# Patient Record
Sex: MALE | Race: WHITE | NOT HISPANIC OR LATINO | URBAN - METROPOLITAN AREA
[De-identification: names, ages, dates, MRNs, and addresses within clinical notes are randomized per-mention and may not be internally consistent; named-entity substitution may affect disease eponyms.]

---

## 2023-12-14 ENCOUNTER — OFFICE VISIT (OUTPATIENT)
Age: 38
End: 2023-12-14
Payer: COMMERCIAL

## 2023-12-14 VITALS
OXYGEN SATURATION: 98 % | BODY MASS INDEX: 31.34 KG/M2 | RESPIRATION RATE: 16 BRPM | HEART RATE: 83 BPM | SYSTOLIC BLOOD PRESSURE: 130 MMHG | HEIGHT: 66 IN | WEIGHT: 195 LBS | TEMPERATURE: 97.9 F | DIASTOLIC BLOOD PRESSURE: 72 MMHG

## 2023-12-14 DIAGNOSIS — Z11.4 SCREENING FOR HIV (HUMAN IMMUNODEFICIENCY VIRUS): ICD-10-CM

## 2023-12-14 DIAGNOSIS — E55.9 VITAMIN D INSUFFICIENCY: ICD-10-CM

## 2023-12-14 DIAGNOSIS — Z13.6 ENCOUNTER FOR LIPID SCREENING FOR CARDIOVASCULAR DISEASE: ICD-10-CM

## 2023-12-14 DIAGNOSIS — R03.0 ELEVATED BLOOD PRESSURE READING: ICD-10-CM

## 2023-12-14 DIAGNOSIS — R21 RASH: ICD-10-CM

## 2023-12-14 DIAGNOSIS — K21.9 GASTROESOPHAGEAL REFLUX DISEASE WITHOUT ESOPHAGITIS: ICD-10-CM

## 2023-12-14 DIAGNOSIS — Z00.00 HEALTHCARE MAINTENANCE: Primary | ICD-10-CM

## 2023-12-14 DIAGNOSIS — E55.9 INADEQUATE VITAMIN D AND VITAMIN D DERIVATIVE INTAKE: ICD-10-CM

## 2023-12-14 DIAGNOSIS — Z13.220 ENCOUNTER FOR LIPID SCREENING FOR CARDIOVASCULAR DISEASE: ICD-10-CM

## 2023-12-14 DIAGNOSIS — Z11.59 NEED FOR HEPATITIS C SCREENING TEST: ICD-10-CM

## 2023-12-14 PROCEDURE — 99385 PREV VISIT NEW AGE 18-39: CPT | Performed by: FAMILY MEDICINE

## 2023-12-14 PROCEDURE — 99214 OFFICE O/P EST MOD 30 MIN: CPT | Performed by: FAMILY MEDICINE

## 2023-12-14 RX ORDER — OMEPRAZOLE 20 MG/1
20 CAPSULE, DELAYED RELEASE ORAL DAILY
COMMUNITY

## 2023-12-14 RX ORDER — MULTIVIT-MIN/IRON FUM/FOLIC AC 7.5 MG-4
1 TABLET ORAL DAILY
COMMUNITY

## 2023-12-14 RX ORDER — PREDNISONE 10 MG/1
TABLET ORAL DAILY
Qty: 45 TABLET | Refills: 0 | Status: SHIPPED | OUTPATIENT
Start: 2023-12-14 | End: 2024-01-02

## 2023-12-14 NOTE — ASSESSMENT & PLAN NOTE
Severe viral or bacterial infection over a month ago which proceeded with start of rash of the bilateral arms and progressed to lower extremity, abdomen and just about the whole body except for palms and soles, genital region, buttock, face. Denies any itch. has been to the urgent care several times received 2 burst of prednisone treatment. Antiviral treatment no improvement. Exam significant for diffuse raised erythema without any discharge appears dry, severe throughout the body. Based on initial pictures to the exam today, it started out mild currently in severe form. 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   Return in 4 weeks after completion of labs  Keep well-hydrated, moisturize regularly

## 2023-12-14 NOTE — PROGRESS NOTES
[facility] PRIMARY CARE  [address]  [place], [place]    NAME: Sonia Bryson   AGE: 41 y.o. SEX: male   : 1985          Assessment and Plan:     1. Healthcare maintenance  Assessment & Plan:  Health maintenance completed today. - Medical history reviewed, including existing medical conditions, medications, and surgeries. - Labs discussed to evaluate cholesterol, blood sugar, kidney function, liver function, and other important markers of health. - BMI evaluated and discussed. - Lifestyle and health counseling completed including diet, exercise habits, smoking status, alcohol consumption.   - Cancer screenings discussed: CT lung/prostate/colonoscopy. - Mental health and wellbeing evaluated and discussed. - Family history obtained to identify any of hereditary health risks. Plan   Lab orders in place, f/u results. Start/continue preventative measures as discussed/advised  Complete preventative orders in place as recommended. Learn medication list including their indications as discussed. Return in 4 weeks after completion of labs. Orders:  -     CBC and differential; Future; Expected date: 2023  -     Comprehensive metabolic panel; Future; Expected date: 2023  -     Lipid Panel with Direct LDL reflex; Future  -     Vitamin D 25 hydroxy; Future  -     TSH, 3rd generation with Free T4 reflex; Future; Expected date: 2023    2. Elevated blood pressure reading  Assessment & Plan:  Elevated today  Blood Pressure: 130/72     DASH diet  And monitor for now  Follow up with labs   Return in 4 weeks    Orders:  -     Comprehensive metabolic panel; Future; Expected date: 2023  -     TSH, 3rd generation with Free T4 reflex; Future; Expected date: 2023    3. Gastroesophageal reflux disease without esophagitis  Assessment & Plan:  Currently on omeprazole 20 mg daily      4.  Encounter for lipid screening for cardiovascular disease  -     Lipid Panel with Direct LDL reflex; Future    5. Vitamin D insufficiency  -     Vitamin D 25 hydroxy; Future    6. Inadequate vitamin D and vitamin D derivative intake  -     Vitamin D 25 hydroxy; Future    7. Need for hepatitis C screening test  -     Hepatitis C Antibody; Future    8. Screening for HIV (human immunodeficiency virus)  -     HIV 1/2 AG/AB w Reflex [facility] for 2 yr old and above; Future    9. Rash  Assessment & Plan:  Severe viral or bacterial infection over a month ago which proceeded with start of rash of the bilateral arms and progressed to lower extremity, abdomen and just about the whole body except for palms and soles, genital region, buttock, face. Denies any itch. has been to the urgent care several times received 2 burst of prednisone treatment. Antiviral treatment no improvement. Exam significant for diffuse raised erythema without any discharge appears dry, severe throughout the body. Based on initial pictures to the exam today, it started out mild currently in severe form. 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 Return in 4 weeks after completion of labs  Keep well-hydrated, moisturize regularly    Orders:  -     predniSONE 10 mg tablet; Take 4 tablets (40 mg total) by mouth daily for 5 days, THEN 3 tablets (30 mg total) daily for 4 days, THEN 2 tablets (20 mg total) daily for 4 days, THEN 1 tablet (10 mg total) daily for 3 days, THEN 0.5 tablets (5 mg total) daily for 3 days. 4 tab x 4 days, 3 tabs x 3 days, 2 tabs x 2 days, 1 tab x 1 day then stop. -     Ambulatory Referral to Dermatology;  Future         Immunizations and preventive care screenings were discussed with patient today. Appropriate education was printed on patient's after visit summary. BMI Counseling: Body mass index is 31.47 kg/m². The BMI is above normal. Nutrition recommendations include decreasing portion sizes, encouraging healthy choices of fruits and vegetables, decreasing fast food intake, consuming healthier snacks, limiting drinks that contain sugar, moderation in carbohydrate intake, increasing intake of lean protein, reducing intake of saturated and trans fat and reducing intake of cholesterol. Exercise recommendations include moderate physical activity 150 minutes/week and strength training exercises. Rationale for BMI follow-up plan is due to patient being overweight or obese. Depression Screening and Follow-up Plan: Patient was screened for depression during today's encounter. They screened negative with a PHQ-2 score of 0. Counseling:  Alcohol/drug use: discussed moderation in alcohol intake, the recommendations for healthy alcohol use, and avoidance of illicit drug use. Dental Health: discussed importance of regular tooth brushing, flossing, and dental visits. Injury prevention: discussed safety/seat belts, safety helmets, smoke detectors, carbon dioxide detectors, and smoking near bedding or upholstery. Sexual health: discussed sexually transmitted diseases, partner selection, use of condoms, avoidance of unintended pregnancy, and contraceptive alternatives. Exercise: the importance of regular exercise/physical activity was discussed. Recommend exercise 3-5 times per week for at least 30 minutes.         Follow-up Plan:   Return in about 4 weeks (around 1/11/2024) for chronic disease management.        ________________________________________________________________________   Reason For Visit:   Physical Exam, Establish Care, and Rash        History of Present Illness:     Adult Annual Physical   Pratima Chapa is here for a comprehensive physical exam.     Concerns  The patient reports problems - rash . Rash that started in November 1st, getting worse. All over the body except for genital, butt, palms and soles. Diet and Physical Activity  Diet/Nutrition: poor diet. A lot of fast food. Exercise: no formal exercise. General Health  Sleep: gets 7-8 hours of sleep on average. Hearing: normal - bilateral.  Vision: wears contacts. Dental: regular dental visits and brushes teeth twice daily. Depression Screening  PHQ-2/9 Depression Screening    Little interest or pleasure in doing things: 0 - not at all  Feeling down, depressed, or hopeless: 0 - not at all  PHQ-2 Score: 0  PHQ-2 Interpretation: Negative depression screen       Anxiety: no.  Past thoughts of SI/SH: no.  Past trauma/significant events in life: no.     Health  none    Social History     Substance and Sexual Activity   Sexual Activity Not on file       Others  Safety concerns: no.  Smoking history: no.  Recreational drugs: yes. 2-3 times a week. Alcohol consumption: a few times a week maybe       Review of Systems:   Review of Systems   Constitutional:  Negative for chills, fever and unexpected weight change. HENT:  Negative for congestion, rhinorrhea and sore throat. Eyes:  Negative for visual disturbance. Respiratory:  Negative for chest tightness, shortness of breath and wheezing. Cardiovascular:  Negative for chest pain. Gastrointestinal:  Negative for abdominal pain, constipation, diarrhea, nausea and vomiting. Endocrine: Negative for polyuria. Genitourinary:  Negative for dysuria and hematuria. Skin:  Positive for rash. Neurological:  Negative for dizziness, weakness, light-headedness and headaches. Psychiatric/Behavioral:  Negative for confusion.          Past Medical History:     Past Medical History:   Diagnosis Date    GERD (gastroesophageal reflux disease)         Past Surgical History:     Past Surgical History:   Procedure Laterality Date    HERNIA REPAIR          Family History:     Family History   Problem Relation Age of Onset    Hypertension Mother     Glaucoma Mother         Social History:    reports that he has never smoked. He has never used smokeless tobacco. He reports current alcohol use of about 3.0 standard drinks of alcohol per week. He reports current drug use. Frequency: 3.00 times per week. Drug: Marijuana. Current Medications:     Current Outpatient Medications:     Multiple Vitamins-Minerals (multivitamin with minerals) tablet, Take 1 tablet by mouth daily, Disp: , Rfl:     omeprazole (PriLOSEC) 20 mg delayed release capsule, Take 20 mg by mouth daily, Disp: , Rfl:     predniSONE 10 mg tablet, Take 4 tablets (40 mg total) by mouth daily for 5 days, THEN 3 tablets (30 mg total) daily for 4 days, THEN 2 tablets (20 mg total) daily for 4 days, THEN 1 tablet (10 mg total) daily for 3 days, THEN 0.5 tablets (5 mg total) daily for 3 days. 4 tab x 4 days, 3 tabs x 3 days, 2 tabs x 2 days, 1 tab x 1 day then stop., Disp: 45 tablet, Rfl: 0     Allergies:   No Known Allergies      Physical Exam:     /72 (BP Location: Left arm, Patient Position: Sitting, Cuff Size: Standard)   Pulse 83   Temp 97.9 °F (36.6 °C) (Tympanic)   Resp 16   Ht 5' 6" (1.676 m)   Wt 88.5 kg (195 lb)   SpO2 98%   BMI 31.47 kg/m²      Physical Exam  Vitals reviewed. Constitutional:       General: He is not in acute distress. Appearance: Normal appearance. He is not ill-appearing, toxic-appearing or diaphoretic. HENT:      Head: Normocephalic and atraumatic. Right Ear: External ear normal.      Left Ear: External ear normal.      Nose: Nose normal.      Mouth/Throat:      Mouth: Mucous membranes are moist.   Eyes:      General: No scleral icterus. Right eye: No discharge. Left eye: No discharge. Extraocular Movements: Extraocular movements intact.       Conjunctiva/sclera: Conjunctivae normal. Cardiovascular:      Rate and Rhythm: Normal rate and regular rhythm. Pulses: Normal pulses. Heart sounds: Normal heart sounds. Pulmonary:      Effort: Pulmonary effort is normal. No respiratory distress. Breath sounds: Normal breath sounds. Abdominal:      Palpations: Abdomen is soft. Tenderness: There is no abdominal tenderness. Musculoskeletal:         General: No swelling. Normal range of motion. Cervical back: Normal range of motion. Skin:     General: Skin is warm and dry. Neurological:      General: No focal deficit present. Mental Status: He is alert and oriented to person, place, and time. Psychiatric:         Mood and Affect: Mood normal.         Behavior: Behavior normal.         Thought Content:  Thought content normal.           Yamile Ervin MD  Family Medicine Physician   [facility]

## 2023-12-14 NOTE — ASSESSMENT & PLAN NOTE
Health maintenance completed today. - Medical history reviewed, including existing medical conditions, medications, and surgeries. - Labs discussed to evaluate cholesterol, blood sugar, kidney function, liver function, and other important markers of health. - BMI evaluated and discussed. - Lifestyle and health counseling completed including diet, exercise habits, smoking status, alcohol consumption.   - Cancer screenings discussed: CT lung/prostate/colonoscopy. - Mental health and wellbeing evaluated and discussed. - Family history obtained to identify any of hereditary health risks. Plan   Lab orders in place, f/u results. Start/continue preventative measures as discussed/advised  Complete preventative orders in place as recommended. Learn medication list including their indications as discussed. Return in 4 weeks after completion of labs.

## 2023-12-14 NOTE — ASSESSMENT & PLAN NOTE
Elevated today  Blood Pressure: 130/72     DASH diet  And monitor for now  Follow up with labs   Return in 4 weeks

## 2023-12-15 ENCOUNTER — NURSE TRIAGE (OUTPATIENT)
Age: 38
End: 2023-12-15

## 2023-12-15 ENCOUNTER — TELEPHONE (OUTPATIENT)
Age: 38
End: 2023-12-15

## 2023-12-15 NOTE — TELEPHONE ENCOUNTER
PT has rash all over body that started about 3 weeks after being sick. PT not sure what he had. Rash has not improved and is getting worse even with three rounds of oral steroids. Was seen in urgent care. Has referral for Derm. Denies fevers or other symptoms besides feeling like he has a cold. Reason for Disposition   SEVERE itching    Answer Assessment - Initial Assessment Questions  1. APPEARANCE of RASH: "Describe the rash." (e.g., spots, blisters, raised areas, skin peeling, scaly)      It looked like poison ivy in the beginning and continued to spread over two weeks while I was on prednisone. It is blotchy looking and is red in color, flaky dry skin  2. SIZE: "How big are the spots?" (e.g., tip of pen, eraser, coin; inches, centimeters)      Some are small and some are big patches that are connected to each other  3. LOCATION: "Where is the rash located?"      All over body. See some improvement on arms but the rest of body no improvement  4. COLOR: "What color is the rash?" (Note: It is difficult to assess rash color in people with darker-colored skin. When this situation occurs, simply ask the caller to describe what they see.)      red  5. ONSET: "When did the rash begin?"      11/01/23  6. FEVER: "Do you have a fever?" If Yes, ask: "What is your temperature, how was it measured, and when did it start?"      Denies, had one prior to rash  7. ITCHING: "Does the rash itch?" If Yes, ask: "How bad is the itch?" (Scale 1-10; or mild, moderate, severe)      It is not as bad as it was when the rash first started  8. CAUSE: "What do you think is causing the rash?"      unsure  9. MEDICATION FACTORS: "Have you started any new medications within the last 2 weeks?" (e.g., antibiotics)       Prednisone but rash was before medication  10.  OTHER SYMPTOMS: "Do you have any other symptoms?" (e.g., dizziness, headache, sore throat, joint pain)        Feels like he has a cold now    Protocols used: Rash or Redness - Widespread-ADULT-OH

## 2023-12-15 NOTE — TELEPHONE ENCOUNTER
Received call from patient asking for an ASAP appt.    He has a referral with the status of ASAP for a rash.    HE stated that it began Nov 1st 2023 and is now on his 3rd round of steroids and it has done nothing.     He sated that the rash is all over his body except for private area, palm of his hand, bottom of his feet and head.

## 2023-12-16 ENCOUNTER — APPOINTMENT (OUTPATIENT)
Age: 38
End: 2023-12-16
Payer: COMMERCIAL

## 2023-12-16 DIAGNOSIS — Z13.220 ENCOUNTER FOR LIPID SCREENING FOR CARDIOVASCULAR DISEASE: ICD-10-CM

## 2023-12-16 DIAGNOSIS — Z11.4 SCREENING FOR HIV (HUMAN IMMUNODEFICIENCY VIRUS): ICD-10-CM

## 2023-12-16 DIAGNOSIS — E55.9 INADEQUATE VITAMIN D AND VITAMIN D DERIVATIVE INTAKE: ICD-10-CM

## 2023-12-16 DIAGNOSIS — Z13.6 ENCOUNTER FOR LIPID SCREENING FOR CARDIOVASCULAR DISEASE: ICD-10-CM

## 2023-12-16 DIAGNOSIS — R03.0 ELEVATED BLOOD PRESSURE READING: ICD-10-CM

## 2023-12-16 DIAGNOSIS — Z00.00 HEALTHCARE MAINTENANCE: ICD-10-CM

## 2023-12-16 DIAGNOSIS — Z11.59 NEED FOR HEPATITIS C SCREENING TEST: ICD-10-CM

## 2023-12-16 DIAGNOSIS — E55.9 VITAMIN D INSUFFICIENCY: ICD-10-CM

## 2023-12-16 LAB
25(OH)D3 SERPL-MCNC: 21.1 NG/ML (ref 30–100)
ALBUMIN SERPL BCP-MCNC: 4.3 G/DL (ref 3.5–5)
ALP SERPL-CCNC: 87 U/L (ref 34–104)
ALT SERPL W P-5'-P-CCNC: 24 U/L (ref 7–52)
ANION GAP SERPL CALCULATED.3IONS-SCNC: 7 MMOL/L
AST SERPL W P-5'-P-CCNC: 19 U/L (ref 13–39)
BASOPHILS # BLD AUTO: 0.03 THOUSANDS/ÂΜL (ref 0–0.1)
BASOPHILS NFR BLD AUTO: 0 % (ref 0–1)
BILIRUB SERPL-MCNC: 0.51 MG/DL (ref 0.2–1)
BUN SERPL-MCNC: 13 MG/DL (ref 5–25)
CALCIUM SERPL-MCNC: 9.9 MG/DL (ref 8.4–10.2)
CHLORIDE SERPL-SCNC: 103 MMOL/L (ref 96–108)
CHOLEST SERPL-MCNC: 196 MG/DL
CO2 SERPL-SCNC: 30 MMOL/L (ref 21–32)
CREAT SERPL-MCNC: 0.91 MG/DL (ref 0.6–1.3)
EOSINOPHIL # BLD AUTO: 0.12 THOUSAND/ÂΜL (ref 0–0.61)
EOSINOPHIL NFR BLD AUTO: 2 % (ref 0–6)
ERYTHROCYTE [DISTWIDTH] IN BLOOD BY AUTOMATED COUNT: 13.4 % (ref 11.6–15.1)
GFR SERPL CREATININE-BSD FRML MDRD: 107 ML/MIN/1.73SQ M
GLUCOSE P FAST SERPL-MCNC: 99 MG/DL (ref 65–99)
HCT VFR BLD AUTO: 44.5 % (ref 36.5–49.3)
HDLC SERPL-MCNC: 65 MG/DL
HGB BLD-MCNC: 14.5 G/DL (ref 12–17)
IMM GRANULOCYTES # BLD AUTO: 0.01 THOUSAND/UL (ref 0–0.2)
IMM GRANULOCYTES NFR BLD AUTO: 0 % (ref 0–2)
LDLC SERPL CALC-MCNC: 114 MG/DL (ref 0–100)
LYMPHOCYTES # BLD AUTO: 3.08 THOUSANDS/ÂΜL (ref 0.6–4.47)
LYMPHOCYTES NFR BLD AUTO: 42 % (ref 14–44)
MCH RBC QN AUTO: 29.4 PG (ref 26.8–34.3)
MCHC RBC AUTO-ENTMCNC: 32.6 G/DL (ref 31.4–37.4)
MCV RBC AUTO: 90 FL (ref 82–98)
MONOCYTES # BLD AUTO: 0.75 THOUSAND/ÂΜL (ref 0.17–1.22)
MONOCYTES NFR BLD AUTO: 10 % (ref 4–12)
NEUTROPHILS # BLD AUTO: 3.33 THOUSANDS/ÂΜL (ref 1.85–7.62)
NEUTS SEG NFR BLD AUTO: 46 % (ref 43–75)
NRBC BLD AUTO-RTO: 0 /100 WBCS
PLATELET # BLD AUTO: 308 THOUSANDS/UL (ref 149–390)
PMV BLD AUTO: 9.5 FL (ref 8.9–12.7)
POTASSIUM SERPL-SCNC: 4.1 MMOL/L (ref 3.5–5.3)
PROT SERPL-MCNC: 7.2 G/DL (ref 6.4–8.4)
RBC # BLD AUTO: 4.94 MILLION/UL (ref 3.88–5.62)
SODIUM SERPL-SCNC: 140 MMOL/L (ref 135–147)
TRIGL SERPL-MCNC: 86 MG/DL
TSH SERPL DL<=0.05 MIU/L-ACNC: 1.73 UIU/ML (ref 0.45–4.5)
WBC # BLD AUTO: 7.32 THOUSAND/UL (ref 4.31–10.16)

## 2023-12-16 PROCEDURE — 36415 COLL VENOUS BLD VENIPUNCTURE: CPT

## 2023-12-16 PROCEDURE — 86803 HEPATITIS C AB TEST: CPT

## 2023-12-16 PROCEDURE — 87389 HIV-1 AG W/HIV-1&-2 AB AG IA: CPT

## 2023-12-16 PROCEDURE — 84443 ASSAY THYROID STIM HORMONE: CPT

## 2023-12-16 PROCEDURE — 80053 COMPREHEN METABOLIC PANEL: CPT

## 2023-12-16 PROCEDURE — 85025 COMPLETE CBC W/AUTO DIFF WBC: CPT

## 2023-12-16 PROCEDURE — 80061 LIPID PANEL: CPT

## 2023-12-16 PROCEDURE — 82306 VITAMIN D 25 HYDROXY: CPT

## 2023-12-17 LAB
HCV AB SER QL: NORMAL
HIV 1+2 AB+HIV1 P24 AG SERPL QL IA: NORMAL
HIV 2 AB SERPL QL IA: NORMAL
HIV1 AB SERPL QL IA: NORMAL
HIV1 P24 AG SERPL QL IA: NORMAL

## 2023-12-18 ENCOUNTER — TELEPHONE (OUTPATIENT)
Age: 38
End: 2023-12-18

## 2023-12-18 DIAGNOSIS — E55.9 VITAMIN D DEFICIENCY: Primary | ICD-10-CM

## 2023-12-18 RX ORDER — CHOLECALCIFEROL (VITAMIN D3) 1250 MCG
50000 CAPSULE ORAL WEEKLY
Qty: 12 CAPSULE | Refills: 0 | Status: SHIPPED | OUTPATIENT
Start: 2023-12-18 | End: 2024-03-05

## 2023-12-18 NOTE — TELEPHONE ENCOUNTER
Spoke with patient and gave him detailed information left by Dr. Benitez about Vit D rx and over the counter Vit D. Also, told him to repeat labs in 3  months.

## 2023-12-20 ENCOUNTER — TELEPHONE (OUTPATIENT)
Dept: DERMATOLOGY | Facility: CLINIC | Age: 38
End: 2023-12-20

## 2023-12-20 ENCOUNTER — LAB (OUTPATIENT)
Dept: LAB | Facility: HOSPITAL | Age: 38
End: 2023-12-20
Payer: COMMERCIAL

## 2023-12-20 ENCOUNTER — CONSULT (OUTPATIENT)
Dept: DERMATOLOGY | Facility: CLINIC | Age: 38
End: 2023-12-20
Payer: COMMERCIAL

## 2023-12-20 VITALS — BODY MASS INDEX: 31.85 KG/M2 | WEIGHT: 198.2 LBS | HEIGHT: 66 IN

## 2023-12-20 DIAGNOSIS — R21 RASH: ICD-10-CM

## 2023-12-20 PROCEDURE — 88305 TISSUE EXAM BY PATHOLOGIST: CPT | Performed by: PATHOLOGY

## 2023-12-20 PROCEDURE — 86060 ANTISTREPTOLYSIN O TITER: CPT

## 2023-12-20 PROCEDURE — 36415 COLL VENOUS BLD VENIPUNCTURE: CPT

## 2023-12-20 PROCEDURE — 11104 PUNCH BX SKIN SINGLE LESION: CPT | Performed by: DERMATOLOGY

## 2023-12-20 PROCEDURE — 99204 OFFICE O/P NEW MOD 45 MIN: CPT | Performed by: DERMATOLOGY

## 2023-12-20 PROCEDURE — 88312 SPECIAL STAINS GROUP 1: CPT | Performed by: PATHOLOGY

## 2023-12-20 PROCEDURE — 86063 ANTISTREPTOLYSIN O SCREEN: CPT

## 2023-12-20 PROCEDURE — 86480 TB TEST CELL IMMUN MEASURE: CPT

## 2023-12-20 RX ORDER — TRIAMCINOLONE ACETONIDE 1 MG/G
CREAM TOPICAL 2 TIMES DAILY
Qty: 453.6 G | Refills: 2 | Status: SHIPPED | OUTPATIENT
Start: 2023-12-20

## 2023-12-20 NOTE — PATIENT INSTRUCTIONS
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 Take a one week break and then you may repeat cycle if needed for flares.  Discussed use of Otezla and/or phototherapy if biopsy proves guttate psoriasis.    PROCEDURE NOTE:  PUNCH BIOPSY    Plan:  1. Instructed to keep the wound dry and covered for 24-48h and clean thereafter.  2. Warning signs of infection were reviewed.    3. Recommended that the patient use acetaminophen as needed for pain  4. Sutures if any should be removed in 10-14 days      Standard post-procedure care has been explained and has been included in written form within the patient's copy of Informed Consent.

## 2023-12-20 NOTE — TELEPHONE ENCOUNTER
Dr. 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 Patient went for quantiferon gold this morning and had other labs drawn 12/16/2023.

## 2023-12-20 NOTE — PROGRESS NOTES
"[facility] Dermatology Clinic Note     Patient Name: Francisco Hampton  Encounter Date: 12/20/2023     Have you been cared for by a [facility] Dermatologist in the last 3 years and, if so, which description applies to you?    NO.   I am considered a \"new\" patient and must complete all patient intake questions. I am MALE/not capable of bearing children.    REVIEW OF SYSTEMS:  Have you recently had or currently have any of the following? Recent fever or chills? No  Any non-healing wound? No   PAST MEDICAL HISTORY:  Have you personally ever had or currently have any of the following?  If \"YES,\" then please provide more detail. Skin cancer (such as Melanoma, Basal Cell Carcinoma, Squamous Cell Carcinoma?  No  Tuberculosis, HIV/AIDS, Hepatitis B or C: No  Radiation Treatment No   HISTORY OF IMMUNOSUPPRESSION:   Do you have a history of any of the following:  Systemic Immunosuppression such as Diabetes, Biologic or Immunotherapy, Chemotherapy, Organ Transplantation, Bone Marrow Transplantation?  No     Answering \"YES\" requires the addition of the dotphrase \"IMMUNOSUPPRESSED\" as the first diagnosis of the patient's visit.   FAMILY HISTORY:  Any \"first degree relatives\" (parent, brother, sister, or child) with the following?    Skin Cancer, Pancreatic or Other Cancer? No   PATIENT EXPERIENCE:    Do you want the Dermatologist to perform a COMPLETE skin exam today including a clinical examination under the \"bra and underwear\" areas?  NO  If necessary, do we have your permission to call and leave a detailed message on your Preferred Phone number that includes your specific medical information?  Yes      No Known Allergies   Current Outpatient Medications:   •  Cholecalciferol (Vitamin D3) 1.25 MG (05664 UT) CAPS, Take 1 capsule (50,000 Units total) by mouth once a week for 12 doses, Disp: 12 capsule, Rfl: 0  •  Multiple Vitamins-Minerals (multivitamin with minerals) tablet, Take 1 tablet by mouth daily, Disp: , Rfl:   •  omeprazole " (PriLOSEC) 20 mg delayed release capsule, Take 20 mg by mouth daily, Disp: , Rfl:   •  predniSONE 10 mg tablet, Take 4 tablets (40 mg total) by mouth daily for 5 days, THEN 3 tablets (30 mg total) daily for 4 days, THEN 2 tablets (20 mg total) daily for 4 days, THEN 1 tablet (10 mg total) daily for 3 days, THEN 0.5 tablets (5 mg total) daily for 3 days. 4 tab x 4 days, 3 tabs x 3 days, 2 tabs x 2 days, 1 tab x 1 day then stop., Disp: 45 tablet, Rfl: 0  •  triamcinolone (KENALOG) 0.1 % cream, Apply topically 2 (two) times a day, Disp: 453.6 g, Rfl: 2          Whom besides the patient is providing clinical information about today's encounter?   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 He had an URI and sore throat for 3 weeks leading up to the rash. Was seen at [facility] and  his PCP. He was prescribed a one week course, a 9 day course and now a 1 month prednisone taper and acyclovir. Reports it was itchy in the first week and then for 1-2 weeks afterwards his skin was very dry. He has seen significant improvement over the past week. Reports having it on entire body except face, genitals, palms and feet.    Assessment and Plan:  Based on a thorough discussion of this condition and the management approach to it (including a comprehensive discussion of the known risks, side effects and potential benefits of treatment), the patient (family) agrees to implement the following specific plan:  Punch biopsy with signed consent  Obtain quantiferon gold and aso panel  Apply Triamcinolone 0.1% cream to affected areas twice daily for up to  weeks. Take a one week break and then you may repeat cycle if needed for flares.  Discussed use of Otezla and/or phototherapy if biopsy proves guttate psoriasis.    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    Procedure Details     Patient informed of the risks (including bleeding,scaring and infection) and benefits of the procedure explained. Verbal and written informed consent obtained. The area was prepped and draped in the usual fashion. Anesthesia was obtained with 1% lidocaine with epinephrine. The skin was then stretched perpendicular to the skin tension lines and a punch biopsy to an appropriate sampling depth was obtained with a 4 mm punch with a forceps and iris scissors.     Hemostasis was obtained with 5-0 Ethilon x 1 sutures.     Complications:  None      Specimen has been sent for review by Dermatopathology.      Plan:  1. Instructed to keep the wound dry and covered for 24-48h and clean thereafter.  2. Warning signs of infection were reviewed.    3. Recommended that the patient use acetaminophen as needed for pain  4. Sutures if any should be removed in 10-14 days      Standard post-procedure care has been explained and has been included in written form within the patient's copy of Informed Consent.    Skin Type:  II    Treatment Type:  Lo UVB    Schedule:  2 X WEEK     Secondary Treatment:  No    Topical Application:  Mineral oil     Total BSA%:10    Severity: severe    Starting Mjoules/MED:per protocol    Maximum dose:per protocol    Increase dose for each treatment:10%      Diagnosisguttate psoriasis    Recommendations :  May need biologic if failure of response.    *      Scribe Attestation    I,:   Ruth Ivan am acting as a scribe while in the presence of the attending physician.:       I,:  Dmitry Vargas MD personally performed the services described in this documentation    as scribed in my presence.:

## 2023-12-21 DIAGNOSIS — J02.0 STREP PHARYNGITIS: Primary | ICD-10-CM

## 2023-12-21 LAB
ASO AB SERPL-ACNC: ABNORMAL IU/ML
ASO AB TITR SER LA: NORMAL {TITER}
GAMMA INTERFERON BACKGROUND BLD IA-ACNC: 0.2 IU/ML
M TB IFN-G BLD-IMP: NEGATIVE
M TB IFN-G CD4+ BCKGRND COR BLD-ACNC: 0 IU/ML
M TB IFN-G CD4+ BCKGRND COR BLD-ACNC: 0.02 IU/ML
MITOGEN IGNF BCKGRD COR BLD-ACNC: 9.8 IU/ML

## 2023-12-21 RX ORDER — PENICILLIN V POTASSIUM 500 MG/1
500 TABLET ORAL EVERY 8 HOURS SCHEDULED
Qty: 21 TABLET | Refills: 0 | Status: SHIPPED | OUTPATIENT
Start: 2023-12-21 | End: 2023-12-28

## 2023-12-21 NOTE — RESULT ENCOUNTER NOTE
ASO titer positive indicative of recent strep infection..  Given generalized flair of guttate psoriasis, will treat with 7 days of penicillin VK

## 2023-12-22 NOTE — TELEPHONE ENCOUNTER
PA for Otezla starter pack    Submitted via  []CMM-KEY  []Surescripts-Case ID #  []Faxed to plan   [x]Other I called latonia at 1 [phone], PA was started and we will be receiving a fax to fill out and send back with clinical notes.

## 2023-12-27 PROCEDURE — 88305 TISSUE EXAM BY PATHOLOGIST: CPT | Performed by: PATHOLOGY

## 2023-12-27 PROCEDURE — 88312 SPECIAL STAINS GROUP 1: CPT | Performed by: PATHOLOGY

## 2023-12-28 NOTE — RESULT ENCOUNTER NOTE
DERMATOPATHOLOGY RESULT NOTE    Results reviewed by ordering physician.  Called patient to personally discuss results. No answer, left voicemail with result.      Instructions for Clinical Derm Team:   (remember to route Result Note to appropriate staff):    None    Result & Plan by Specimen:    Specimen A: benign  Plan:  I left message that biopsy is consistent with psoriasis.  We stick to original plan of topicals, phototherapy, antibiotic and potentially biologic (otezla)    Final Diagnosis  A. Skin, A: Right upper chest, punch biopsy:  - Consistent with guttate psoriasis.    -- Mild spongiosis and acanthosis with small foci of neutrophilic parakeratosis and underlying        hypogranulosis.    -- Mild superficial perivascular inflammation of predominantly lymphocytes.   - Special stain for fungus (PAS) negative.     Electronically signed by Ava Goode MD on 12/27/2023 at

## 2024-01-04 ENCOUNTER — CLINICAL SUPPORT (OUTPATIENT)
Dept: DERMATOLOGY | Facility: CLINIC | Age: 39
End: 2024-01-04

## 2024-01-04 DIAGNOSIS — Z48.02 ENCOUNTER FOR REMOVAL OF SUTURES: Primary | ICD-10-CM

## 2024-01-04 PROCEDURE — RECHECK: Performed by: DERMATOLOGY

## 2024-01-04 NOTE — PROGRESS NOTES
Suture removal    Date/Time: 1/4/2024 8:30 AM    Performed by: Ricardo Sommer  Authorized by: Dmitry Vargas MD  Universal Protocol:  Consent: Verbal consent obtained. Written consent not obtained.  Risks and benefits: risks, benefits and alternatives were discussed  Consent given by: patient  Timeout called at: 1/4/2024 8:15 AM.  Patient understanding: patient states understanding of the procedure being performed  Patient consent: the patient's understanding of the procedure matches consent given  Procedure consent: procedure consent matches procedure scheduled  Relevant documents: relevant documents not present or verified  Test results: test results not available  Site marked: the operative site was not marked  Radiology Images displayed and confirmed. If images not available, report reviewed: imaging studies not available  Patient identity confirmed: verbally with patient      Patient location:  Clinic  Location:     Laterality:  Right    Location:  Trunk    Trunk location:  Chest  Procedure details:     Tools used:  Suture removal kit    Wound appearance:  No sign(s) of infection, good wound healing, nonpurulent and clean  Post-procedure details:     Post-removal:  Band-Aid applied    Patient tolerance of procedure:  Tolerated well, no immediate complications  Comments:      Patient was encouraged to continue to clean and care for the wound until fully healed. Patient was encourage to continue to follow up for regular full body skin exams as scheduled.       Before Removal:    After Removal:

## 2024-01-08 ENCOUNTER — OFFICE VISIT (OUTPATIENT)
Dept: DERMATOLOGY | Facility: CLINIC | Age: 39
End: 2024-01-08
Payer: COMMERCIAL

## 2024-01-08 DIAGNOSIS — L40.9 PSORIASIS: Primary | ICD-10-CM

## 2024-01-08 PROCEDURE — 96910 PHOTCHMTX TAR&UVB/PTRLTM&UVB: CPT | Performed by: DERMATOLOGY

## 2024-01-08 NOTE — PROGRESS NOTES
PHOTOTHERAPY    Treatment type: Phototherapy  Visit number: 1  Diagnosis: Psoriasis  Anatomical location: Full body  Severity: Severe  BSA: 10%  Treatment schedule: 2x weekly  Reaction: None  Increase %: 10%  mJoules: 90 mJ  Extra UVA: No  Max dose: (P) 2000 mJ for body, 1000 mJ for face  Topical: Mineral oil  Topical applied by: Patient & Assistant  Special shield: Goggles (Patient also covers face in willett)  Tech: EZEQUIEL Rowland  Comments: Next appt:1/11/24

## 2024-01-08 NOTE — PROGRESS NOTES
PHOTOTHERAPY    Treatment type: Phototherapy  Visit number: 1  Diagnosis: Psoriasis  Anatomical location: Full body  Severity: Severe  BSA: 10%  Treatment schedule: 2x weekly  Reaction: None  Increase %: 10%  mJoules: 90 mJ  Extra UVA: No  Max dose: 2000 mJ  Topical: Mineral oil  Topical applied by: Patient & Assistant  Special shield: Goggles (Patient also covers face in willett)  Tech: EZEQUIEL Rowland  Comments: Next appt:1/11/24

## 2024-01-10 DIAGNOSIS — R21 RASH: ICD-10-CM

## 2024-01-10 RX ORDER — PREDNISONE 10 MG/1
TABLET ORAL
Qty: 45 TABLET | Refills: 0 | OUTPATIENT
Start: 2024-01-10 | End: 2024-01-28

## 2024-01-11 ENCOUNTER — OFFICE VISIT (OUTPATIENT)
Dept: DERMATOLOGY | Facility: CLINIC | Age: 39
End: 2024-01-11
Payer: COMMERCIAL

## 2024-01-11 DIAGNOSIS — L40.9 PSORIASIS: Primary | ICD-10-CM

## 2024-01-11 PROCEDURE — 96910 PHOTCHMTX TAR&UVB/PTRLTM&UVB: CPT | Performed by: DERMATOLOGY

## 2024-01-11 NOTE — PROGRESS NOTES
PHOTOTHERAPY    Treatment type: Phototherapy  Visit number: 2  Diagnosis: psoriasis  Anatomical location: Full body  Severity: Severe  BSA: 10%  Treatment schedule: 2x weekly  Reaction: None  Increase %: 10%  mJoules: 108  Extra UVA: No  Max dose: 2000mJ for body; 1000mJ for face  Topical: Mineral oil  Topical applied by: Patient  Special shield: Ramón  Tech: Janene GAGNON  Comments: next appt. 11/15/24

## 2024-01-15 ENCOUNTER — OFFICE VISIT (OUTPATIENT)
Dept: DERMATOLOGY | Facility: CLINIC | Age: 39
End: 2024-01-15
Payer: COMMERCIAL

## 2024-01-15 DIAGNOSIS — L40.9 PSORIASIS: Primary | ICD-10-CM

## 2024-01-15 PROCEDURE — 96910 PHOTCHMTX TAR&UVB/PTRLTM&UVB: CPT | Performed by: DERMATOLOGY

## 2024-01-15 NOTE — PROGRESS NOTES
PHOTOTHERAPY    Treatment type: Phototherapy  Visit number: 3  Diagnosis: psoriasis  Anatomical location: Full body  Severity: Severe  BSA: 10%  Treatment schedule: 2x weekly  Reaction: None  Increase %: 10%  mJoules: 120mJ  Extra UVA: No  Max dose: 2000mJ for Body;1000mJ for face  Topical: Mineral oil  Topical applied by: Patient  Special shield: Ramón  Tech: Janene GAGNON  Comments: next appt 1/18/24

## 2024-01-18 ENCOUNTER — OFFICE VISIT (OUTPATIENT)
Dept: DERMATOLOGY | Facility: CLINIC | Age: 39
End: 2024-01-18
Payer: COMMERCIAL

## 2024-01-18 ENCOUNTER — OFFICE VISIT (OUTPATIENT)
Age: 39
End: 2024-01-18
Payer: COMMERCIAL

## 2024-01-18 VITALS
HEIGHT: 66 IN | WEIGHT: 197 LBS | BODY MASS INDEX: 31.66 KG/M2 | SYSTOLIC BLOOD PRESSURE: 126 MMHG | DIASTOLIC BLOOD PRESSURE: 82 MMHG | HEART RATE: 81 BPM | TEMPERATURE: 97.5 F | OXYGEN SATURATION: 98 %

## 2024-01-18 DIAGNOSIS — E78.5 HYPERLIPIDEMIA WITH TARGET LDL LESS THAN 100: ICD-10-CM

## 2024-01-18 DIAGNOSIS — E55.9 VITAMIN D DEFICIENCY: ICD-10-CM

## 2024-01-18 DIAGNOSIS — R03.0 ELEVATED BLOOD PRESSURE READING: Primary | ICD-10-CM

## 2024-01-18 DIAGNOSIS — L40.4 GUTTATE PSORIASIS: ICD-10-CM

## 2024-01-18 DIAGNOSIS — L40.9 PSORIASIS: Primary | ICD-10-CM

## 2024-01-18 PROCEDURE — 96910 PHOTCHMTX TAR&UVB/PTRLTM&UVB: CPT | Performed by: DERMATOLOGY

## 2024-01-18 PROCEDURE — 99214 OFFICE O/P EST MOD 30 MIN: CPT | Performed by: FAMILY MEDICINE

## 2024-01-18 NOTE — PATIENT INSTRUCTIONS
Together as a team our goal is to practice preventative care, avoid chronic diseases, and/or avoid further progression of current chronic diseases.   Here are my recommendations as we discussed during our office visit:    Exercise:  150 minutes of moderate intensity workout for overall general health  200-300 minutes of moderate intensity workout for weight loss.   The first 30 minutes of exercising, the body will take energy from what we ate that day. Then after that 30-minute lacie, the body will take energy from the stored fats.  Therefore, it will be more beneficial to do longer sessions and less frequently in a week to help with our weight loss journey.  I would recommend 60-minute sessions 4 times a week   Strength training 2 times a week for good bone health    Nutritional intake (diet):  Remember, it is not about finding a diet to lose weight quickly, rather adjusting your lifestyle for healthy living long-term.   When we build good habits, it does not become difficult to maintain it.  Focus on a low-carb diet.  The best diet is Mediterranean diet, which is a low-carb diet.  There are good carbs and bad carbs, minimize the bad carbs.  Bad carbs: Refined carbohydrates or simple carbohydrates that have been processed and stripped of their natural fiber and nutrients.    These carbohydrates can lead to rapid spikes in blood sugar levels and are often associated with low nutritional value. The big 4: Bread, rice, pasta, potatoes  Refined grains-white bread, white rice, pasta made from refined flour.  Sugary foods and beverages: Candy, pastries, sugary cereals, soda, and other sugary drinks.  Processed snacks: Chips, cookies, sugary granola bars  Sweetened breakfast cereals: Cereals with added sugars.  Sweets and desserts: Cakes, ice cream, pies  Good carbs: These are referred to complex carbohydrates that are unprocessed or minimally processed, providing more nutrients.  Here examples of good carbs:  Whole  grains: Brown rice, quinoa, oats, whole-wheat products   Legumes: Lentils, chickpeas, black beans, kidney beans  Starchy vegetables: Sweet potatoes, butternut squash  Whole fruits: Berries, apples, oranges, bananas  Vegetables: Broccoli, spinach, kale, [place] sprouts  Nuts and seeds: Almonds, walnuts, Reynaldo seeds, flaxseeds.  Cooking oil  Avoid the following oil for cooking: Canola, corn, vegetable, sunflower, peanut, sesame, grapeseed, flaxseed.  Even though it states it is heart healthy, however, they are significantly processed and refined.   Would recommend instead the following cooking oil: Olive oil, coconut oil, avocado oil, ghee, butter.  Focus on eating whole foods.  What are whole foods?  Whole Foods refer to natural, unprocessed, or minimally processed foods that are as close to the original form as possible.  These foods are in their natural state and have undergone little to no refined or alteration.  Whole Foods are valued for their nutrient density, providing essential vitamins, minerals, fiber, and other beneficial compounds.  Examples of whole foods include:  Fruits and vegetables without added preservatives or processing.    Whole grains-unrefined grains like brown rice, quinoa, and whole-wheat, which retain their brain, germ, and endosperm.  Legumes-beans, lentils, and peas in their national unprocessed date.  Nuts and seeds-on roasted, unsalted nuts and seeds without added oils or seasonings.  Meat and fish-fresh, unprocessed meats and fish without additives or preservatives.  Dairy-unprocessed dairy products such as milk, yogurt, and cheese without added sugars or artificial ingredients.  Eggs-eggs in their natural form without additives.    Intermittent fasting:   There are several benefits of intermittent fasting including weight loss, improving insulin sensitivity, improving cardiovascular health by reducing risk factors like blood pressure, cholesterol levels, and triglycerides. It also  promotes brain health, longevity, reduction in inflammatory markers, improves metabolic health, and some studies even suggest intermittent fasting to be protective against certain types of cancers.     The goal of intermittent fasting is to shutdown the insulin hormone, as we discussed, which is a hormone that negatively affects our health when it is around in high levels chronically.     Start intermittent fasting for 14 hours initially, then increase to 16 hours, with a goal to reach 18 hours.  During fasting - may drink water without any additives such as sweeteners, black coffee, tea without any additives including milk.   Eat nutritious meals 2 times a day  Avoid snacking in between meals.  If you end up snacking, snack on fruits/vegetables.  Initially it might be difficult, however, over time you will notice a positive change in your energy, mood, and weight.

## 2024-01-18 NOTE — PROGRESS NOTES
PHOTOTHERAPY    Treatment type: Phototherapy  Visit number: 4  Diagnosis: psoriasis  Anatomical location: Full body  Severity: Severe  BSA: 10%  Treatment schedule: 2x weekly  Reaction: None  Increase %: 10%  mJoules: 132mJ  Extra UVA: No  Max dose: 2000mJ for body; 1000mJ for face  Topical: Mineral oil  Topical applied by: Patient  Special shield: Goggles  Tech: Maryse Smith  Comments: next appt 1/22/24    Scribe Attestation      I,:  Lucero Smith am acting as a scribe while in the presence of the attending physician.:       I,:  Derrick Phoenix MD personally performed the services described in this documentation    as scribed in my presence.:

## 2024-01-18 NOTE — ASSESSMENT & PLAN NOTE
Low vitamin D levels    Continue  vitamin D3 50,000 units once a week for 12 weeks, request for additional refill for another 12 week course.  Follow-up with vitamin D level in 3 months, after completion of 12-week course to determine daily dose  Start vitamin D3 4784-9708 units daily supplements from over-the-counter after completion of 12-week course

## 2024-01-18 NOTE — ASSESSMENT & PLAN NOTE
Lab Results   Component Value Date    CHOLESTEROL 196 12/16/2023     Lab Results   Component Value Date    HDL 65 12/16/2023     Lab Results   Component Value Date    TRIG 86 12/16/2023       Currently on not on a statin.  Reviewed lipid panel 1/18/2024  Triglycerides/HDL ratio: 1.32. Goal <2, 12/16/2023   Discussed importance of nutritional intake, exercising regularly.    Decrease calorie intake as discussed  Recommend low-carb diet  Exercise regularly as discussed  In addition to diet supplement omega-3 with fish oil as discussed.  Monitor lipid panel in 6-12 months.

## 2024-01-18 NOTE — PROGRESS NOTES
[facility] PRIMARY [facility]  [address] [place] [place]    Name: Francisco Hampton      YOB: 1985      MRN: [id number]  Encounter Provider: Michelet Benitez MD      Encounter Date: 01/18/24      Encounter Dept: [facility]    Assessment & Plan     1. Elevated blood pressure reading  Assessment & Plan:  Blood Pressure: 126/82     DASH diet  Monitor for now.  Labs reviewed  Follow up in one year      2. Guttate psoriasis  Assessment & Plan:  Improving, biopsied by dermatology and being currently managed with phototherapy.    Biopsy showed ASO titer positive which is indicative of recent strep infection.  Patient was given 7 days of penicillin VK which he has completed.  Currently doing phototherapy 2 times a week at [facility].    Severe viral or bacterial infection October - November 2023 which proceeded with start of rash of the bilateral arms and progressed to lower extremity, abdomen and just about the whole body except for palms and soles, genital region, buttock, face.  Status post 3 courses of steroids, the last one prescribed by me during our first office visit.    Management continued by dermatologist        3. Hyperlipidemia with target LDL less than 100  Assessment & Plan:  Lab Results   Component Value Date    CHOLESTEROL 196 12/16/2023     Lab Results   Component Value Date    HDL 65 12/16/2023     Lab Results   Component Value Date    TRIG 86 12/16/2023       Currently on not on a statin.  Reviewed lipid panel 1/18/2024  Triglycerides/HDL ratio: 1.32. Goal <2, 12/16/2023   Discussed importance of nutritional intake, exercising regularly.    Decrease calorie intake as discussed  Recommend low-carb diet  Exercise regularly as discussed  In addition to diet supplement omega-3 with fish oil as discussed.  Monitor lipid panel in 6-12 months.        4. Vitamin D deficiency  Assessment & Plan:  Low vitamin D  "levels    Continue  vitamin D3 50,000 units once a week for 12 weeks, request for additional refill for another 12 week course.  Follow-up with vitamin D level in 3 months, after completion of 12-week course to determine daily dose  Start vitamin D3 0096-2705 units daily supplements from over-the-counter after completion of 12-week course               Pertinent labs were evaluated and discussed with patient today.       Follow-Up Plans   Return in about 6 months (around 7/18/2024) for chronic disease management.          _______________________________________________________________________  Reason For Visit    Follow-up (4 week follow up)      Gabriela Hampton is a 38 y.o. with  has a past medical history of GERD (gastroesophageal reflux disease) and Guttate psoriasis (12/20/2023).      Today patient reports about follow-up for rash and elevated blood pressure.     Sudden onset of rash in November 2023, diagnosed by dermatology to be guttate psoriasis getting phototherapy treatment.           Review of Systems   Constitutional:  Negative for chills and fever.         Current Medication List     Current Outpatient Medications:     Cholecalciferol (Vitamin D3) 1.25 MG (29845 UT) CAPS, Take 1 capsule (50,000 Units total) by mouth once a week for 12 doses, Disp: 12 capsule, Rfl: 0    Multiple Vitamins-Minerals (multivitamin with minerals) tablet, Take 1 tablet by mouth daily, Disp: , Rfl:     omeprazole (PriLOSEC) 20 mg delayed release capsule, Take 20 mg by mouth daily, Disp: , Rfl:     triamcinolone (KENALOG) 0.1 % cream, Apply topically 2 (two) times a day, Disp: 453.6 g, Rfl: 2      Objective     /82 (BP Location: Left arm, Patient Position: Sitting, Cuff Size: Standard)   Pulse 81   Temp 97.5 °F (36.4 °C) (Tympanic)   Ht 5' 6\" (1.676 m)   Wt 89.4 kg (197 lb)   SpO2 98%   BMI 31.80 kg/m²      Physical Exam  Vitals reviewed.   Constitutional:       General: He is not in acute distress.     " Appearance: Normal appearance. He is not ill-appearing, toxic-appearing or diaphoretic.   HENT:      Head: Normocephalic and atraumatic.      Right Ear: External ear normal.      Left Ear: External ear normal.      Nose: Nose normal.      Mouth/Throat:      Mouth: Mucous membranes are moist.   Eyes:      General: No scleral icterus.        Right eye: No discharge.         Left eye: No discharge.      Conjunctiva/sclera: Conjunctivae normal.   Cardiovascular:      Rate and Rhythm: Normal rate.   Pulmonary:      Effort: Pulmonary effort is normal. No respiratory distress.   Skin:     General: Skin is warm.      Findings: Rash (Improved compared to initial appointment) present.   Neurological:      General: No focal deficit present.      Mental Status: He is alert and oriented to person, place, and time.   Psychiatric:         Mood and Affect: Mood normal.         Behavior: Behavior normal.         Thought Content: Thought content normal.           Michelet Benitez MD  Family Medicine Physician   [facility] PRIMARY CARE [place]

## 2024-01-18 NOTE — ASSESSMENT & PLAN NOTE
Improving, biopsied by dermatology and being currently managed with phototherapy.    Biopsy showed ASO titer positive which is indicative of recent strep infection.  Patient was given 7 days of penicillin VK which he has completed.  Currently doing phot[facility] 2 [facility] a [facility] at the [facility].    Severe viral or bacterial infection October - November 2023 which proceeded with start of rash of the bilateral arms and progressed to lower extremity, abdomen and just about the whole body except for palms and soles, genital region, buttock, face.  Status post 3 courses of steroids, the last one prescribed by me during our first office visit.    Management continued by dermatologist

## 2024-01-18 NOTE — ASSESSMENT & PLAN NOTE
Blood Pressure: 126/82     DASH diet  Monitor for now.  Labs reviewed  Follow up in one year   Jesus Koo needs refill of   Requested Prescriptions     Pending Prescriptions Disp Refills    diclofenac (VOLTAREN) 75 MG EC tablet 60 tablet 0     Sig: Take 1 tablet by mouth 2 times daily       Last Filled on:  4/1/2019 #60/NR    Last Visit Date:  5/24/2019    Next Visit Date:    Visit date not found

## 2024-01-22 ENCOUNTER — OFFICE VISIT (OUTPATIENT)
Dept: DERMATOLOGY | Facility: CLINIC | Age: 39
End: 2024-01-22
Payer: COMMERCIAL

## 2024-01-22 DIAGNOSIS — L40.9 PSORIASIS: Primary | ICD-10-CM

## 2024-01-22 PROCEDURE — 96910 PHOTCHMTX TAR&UVB/PTRLTM&UVB: CPT | Performed by: DERMATOLOGY

## 2024-01-22 NOTE — PROGRESS NOTES
PHOTOTHERAPY    Treatment type: Phototherapy  Visit number: 5  Diagnosis: psoriasis  Anatomical location: Full body  Severity: Severe  BSA: 10%  Treatment schedule: 2x weekly  Reaction: None  Increase %: 10%  mJoules: 146  Extra UVA: No  Max dose: 2000mJ for Body;1000mJ for face  Topical: Mineral oil  Topical applied by: Patient  Special shield: Ramón  Tech: Janene GAGNON  Comments: next appt 1/25/24

## 2024-01-25 ENCOUNTER — OFFICE VISIT (OUTPATIENT)
Dept: DERMATOLOGY | Facility: CLINIC | Age: 39
End: 2024-01-25
Payer: COMMERCIAL

## 2024-01-25 ENCOUNTER — TELEPHONE (OUTPATIENT)
Age: 39
End: 2024-01-25

## 2024-01-25 DIAGNOSIS — L40.9 PSORIASIS: Primary | ICD-10-CM

## 2024-01-25 PROCEDURE — 96910 PHOTCHMTX TAR&UVB/PTRLTM&UVB: CPT | Performed by: DERMATOLOGY

## 2024-01-25 NOTE — TELEPHONE ENCOUNTER
Received call from pt expressing he is frustrated because he is going in for photo therapy twice a week every week.    However his insurance isn't going to cover all of the bills for the photo therapy.     He is concerned that no one is checking the progress on it often and he does not see the doctor till Feb.    He does not know when he will be able to stop photo therapy.    He would like to know when he should expect to be done with photo therapy.    He also stats that the nurses aren't very nice when he goes in for photo therapy    Patient would like a call back to get clarification as to a time frame for photo therapy

## 2024-01-25 NOTE — PROGRESS NOTES
PHOTOTHERAPY    Treatment type: Phototherapy  Visit number: 6  Diagnosis: psoriasis  Anatomical location: Full body  Severity: Severe  BSA: 10%  Treatment schedule: 2x weekly  Reaction: None  Increase %: 10%  mJoules: 161  Extra UVA: No  Max dose: 2000mJ for Body; 1000mJ for face  Topical: Mineral oil  Topical applied by: Patient  Special shield: Ramón  Tech: Janene GAGNON  Comments: Next Appt. 1/29/24

## 2024-01-25 NOTE — TELEPHONE ENCOUNTER
Advise that usually phottherapy is 4 to 6 weeks.  Will offer earlier ov to see if treatment response can be assessed.

## 2024-01-26 NOTE — TELEPHONE ENCOUNTER
Looks like he has an appt on 2/15 at 4:20pm with [facility], did you need another appointment for him?

## 2024-01-26 NOTE — TELEPHONE ENCOUNTER
I called [facility]  second time.  I outlined goals of photohterpay and offered ealrier ov if necessary

## 2024-01-26 NOTE — TELEPHONE ENCOUNTER
If wishes maybe earlier ov at time of phototherapy with [facility] or [facility]??   I tried to call him but no answer.

## 2024-01-29 ENCOUNTER — OFFICE VISIT (OUTPATIENT)
Dept: DERMATOLOGY | Facility: CLINIC | Age: 39
End: 2024-01-29
Payer: COMMERCIAL

## 2024-01-29 DIAGNOSIS — L40.9 PSORIASIS: Primary | ICD-10-CM

## 2024-01-29 PROCEDURE — 96910 PHOTCHMTX TAR&UVB/PTRLTM&UVB: CPT | Performed by: DERMATOLOGY

## 2024-01-29 NOTE — PROGRESS NOTES
PHOTOTHERAPY    Treatment type: Phototherapy  Visit number: 7  Diagnosis: psoriasis  Anatomical location: Full body  Severity: Severe  BSA: 10%  Treatment schedule: 2x weekly  Reaction: None  Increase %: 10%  mJoules: 187  Extra UVA: No  Max dose: 2000mJ for Body; 1000mJ for face  Topical: Mineral oil  Topical applied by: Patient  Special shield: Ramón  Tech: Khari NIEVES  Comments: Next Appt 02/01/24

## 2024-02-01 ENCOUNTER — OFFICE VISIT (OUTPATIENT)
Dept: DERMATOLOGY | Facility: CLINIC | Age: 39
End: 2024-02-01
Payer: COMMERCIAL

## 2024-02-01 DIAGNOSIS — L40.9 PSORIASIS: Primary | ICD-10-CM

## 2024-02-01 PROCEDURE — 96910 PHOTCHMTX TAR&UVB/PTRLTM&UVB: CPT | Performed by: DERMATOLOGY

## 2024-02-01 NOTE — PROGRESS NOTES
PHOTOTHERAPY    Treatment type: Phototherapy  Visit number: 8  Diagnosis: psoriasis  Anatomical location: Full body  Severity: Severe  BSA: 10%  Treatment schedule: 2x weekly  Reaction: None  Increase %: 10%  mJoules: 213  Extra UVA: No  Max dose: 2000mJ for Body; 1000mJ for face  Topical: Mineral oil  Topical applied by: Patient  Special shield: Ramón  Tech: Heather NIEVES  Comments: Next apt 02/05/24

## 2024-02-05 ENCOUNTER — OFFICE VISIT (OUTPATIENT)
Dept: DERMATOLOGY | Facility: CLINIC | Age: 39
End: 2024-02-05
Payer: COMMERCIAL

## 2024-02-05 DIAGNOSIS — L40.9 PSORIASIS: Primary | ICD-10-CM

## 2024-02-05 PROCEDURE — 96910 PHOTCHMTX TAR&UVB/PTRLTM&UVB: CPT | Performed by: DERMATOLOGY

## 2024-02-05 NOTE — PROGRESS NOTES
PHOTOTHERAPY    Treatment type: Phototherapy  Visit number: 9  Diagnosis: psoriasis  Anatomical location: Full body  Severity: Severe  BSA: 10%  Treatment schedule: 2x weekly  Reaction: None  Increase %: 10%  mJoules: 239  Extra UVA: No  Max dose: 2000mJ for Body; 1000mJ for face  Topical: Mineral oil  Topical applied by: Patient  Special shield: Ramón  Tech: dmitri NIEVES  Comments: next appt:02/08/24    Dmitri Hair

## 2024-02-08 ENCOUNTER — OFFICE VISIT (OUTPATIENT)
Dept: DERMATOLOGY | Facility: CLINIC | Age: 39
End: 2024-02-08
Payer: COMMERCIAL

## 2024-02-08 DIAGNOSIS — L40.9 PSORIASIS: Primary | ICD-10-CM

## 2024-02-08 PROCEDURE — 96910 PHOTCHMTX TAR&UVB/PTRLTM&UVB: CPT | Performed by: DERMATOLOGY

## 2024-02-08 NOTE — PROGRESS NOTES
PHOTOTHERAPY    Treatment type: Phototherapy  Visit number: 9  Diagnosis: psoriasis  Severity: Severe  BSA: 10%  Treatment schedule: 2x weekly  Reaction: None  Increase %: 10%  mJoules: 268  Extra UVA: No  Max dose: 2000 mJ for body;1000 mJ for face  Topical: Mineral oil  Topical applied by: Patient  Special shield: Ramón  Tech: Annelise NIEVES  Comments: next appt 2/12/24

## 2024-02-12 ENCOUNTER — OFFICE VISIT (OUTPATIENT)
Dept: DERMATOLOGY | Facility: CLINIC | Age: 39
End: 2024-02-12

## 2024-02-12 DIAGNOSIS — L40.9 PSORIASIS: Primary | ICD-10-CM

## 2024-02-12 PROBLEM — Z00.00 HEALTHCARE MAINTENANCE: Status: RESOLVED | Noted: 2023-12-14 | Resolved: 2024-02-12

## 2024-02-12 NOTE — PROGRESS NOTES
PHOTOTHERAPY    Treatment type: Phototherapy  Visit number: 10  Diagnosis: psoriasis  Severity: Severe  BSA: 10%  Treatment schedule: 2x weekly  Reaction: None  Increase %: 10%  mJoules: 297  Extra UVA: No  Max dose: 2000 mJ for body;1000 mJ for face  Topical: Mineral oil  Topical applied by: Patient  Special shield: Ramón  Tech: Kortney GAGNON  Comments: Next Appt 2/14/24

## 2024-02-15 ENCOUNTER — OFFICE VISIT (OUTPATIENT)
Dept: DERMATOLOGY | Facility: CLINIC | Age: 39
End: 2024-02-15
Payer: COMMERCIAL

## 2024-02-15 VITALS — TEMPERATURE: 98.6 F | BODY MASS INDEX: 31.5 KG/M2 | WEIGHT: 196 LBS | HEIGHT: 66 IN

## 2024-02-15 DIAGNOSIS — L40.4 GUTTATE PSORIASIS: Primary | ICD-10-CM

## 2024-02-15 PROCEDURE — 99213 OFFICE O/P EST LOW 20 MIN: CPT | Performed by: DERMATOLOGY

## 2024-02-15 NOTE — PROGRESS NOTES
"[facility] Dermatology Clinic Note     Patient Name: Francisco Hampton  Encounter Date: 2/15/24     Have you been cared for by a [facility] Dermatologist in the last 3 years and, if so, which description applies to you?    Yes.  I have been here within the last 3 years, and my medical history has NOT changed since that time.  I am MALE/not capable of bearing children.    REVIEW OF SYSTEMS:  Have you recently had or currently have any of the following? No changes in my recent health.   PAST MEDICAL HISTORY:  Have you personally ever had or currently have any of the following?  If \"YES,\" then please provide more detail. No changes in my medical history.   HISTORY OF IMMUNOSUPPRESSION: Do you have a history of any of the following:  Systemic Immunosuppression such as Diabetes, Biologic or Immunotherapy, Chemotherapy, Organ Transplantation, Bone Marrow Transplantation?  No     Answering \"YES\" requires the addition of the dotphrase \"IMMUNOSUPPRESSED\" as the first diagnosis of the patient's visit.   FAMILY HISTORY:  Any \"first degree relatives\" (parent, brother, sister, or child) with the following?    No changes in my family's known health.   PATIENT EXPERIENCE:    Do you want the Dermatologist to perform a COMPLETE skin exam today including a clinical examination under the \"bra and underwear\" areas?  NO  If necessary, do we have your permission to call and leave a detailed message on your Preferred Phone number that includes your specific medical information?  Yes      No Known Allergies   Current Outpatient Medications:   •  Cholecalciferol (Vitamin D3) 1.25 MG (35531 UT) CAPS, Take 1 capsule (50,000 Units total) by mouth once a week for 12 doses, Disp: 12 capsule, Rfl: 0  •  Multiple Vitamins-Minerals (multivitamin with minerals) tablet, Take 1 tablet by mouth daily, Disp: , Rfl:   •  omeprazole (PriLOSEC) 20 mg delayed release capsule, Take 20 mg by mouth daily, Disp: , Rfl:   •  triamcinolone (KENALOG) 0.1 % cream, Apply " "topically 2 (two) times a day, Disp: 453.6 g, Rfl: 2          Whom besides the patient is providing clinical information about today's encounter?   NO ADDITIONAL HISTORIAN (patient alone provided history)    Physical Exam and Assessment/Plan by Diagnosis:    Guttate Psoriasis Follow Up      Physical Exam:  (Anatomic Location); (Size and Morphological Description):  Diffuse body; red plaques     Additional History of Present Condition:  Patient is here for follow up evaluation of psoriasis. He has completed 11 phototherapy treatments (twice a week). He has been using triamcinolone as prescribed with this week being his \"off-week.\" He reports mild improvement.       Assessment and Plan:  Based on a thorough discussion of this condition and the management approach to it (including a comprehensive discussion of the known risks, side effects and potential benefits of treatment), the patient (family) agrees to implement the following specific plan:  Continue Triamcinolone course (2 weeks on, 1 week off)  Continue Phototherapy treatments   Monitor for improvement     Scribe Attestation    I,:  Annelise Real MA am acting as a scribe while in the presence of the attending physician.:       I,:  Dmitry Vargas MD personally performed the services described in this documentation    as scribed in my presence.:            Patient was seen and discussed with Dr. Vargas.   Dmitry Vargas MD 02/15/24   "

## 2024-02-19 ENCOUNTER — OFFICE VISIT (OUTPATIENT)
Dept: DERMATOLOGY | Facility: CLINIC | Age: 39
End: 2024-02-19
Payer: COMMERCIAL

## 2024-02-19 DIAGNOSIS — L40.9 PSORIASIS: Primary | ICD-10-CM

## 2024-02-19 PROCEDURE — 96910 PHOTCHMTX TAR&UVB/PTRLTM&UVB: CPT | Performed by: DERMATOLOGY

## 2024-02-19 NOTE — PROGRESS NOTES
PHOTOTHERAPY    Treatment type: Phototherapy  Visit number: 11  Diagnosis: psoriasis  Severity: Severe  BSA: 10%  Treatment schedule: 2x weekly  Reaction: None  Increase %:  (hold previous dose pt missed 6 days of treatment.)  mJoules: 297 mJ  Extra UVA: No  Max dose: 2000 mJ for body;1000 mJ for face  Topical: Mineral oil  Topical applied by: Patient  Special shield: Ramón  Tech: Janene GAGNON  Comments: next appt. 2/22/24

## 2024-02-22 ENCOUNTER — OFFICE VISIT (OUTPATIENT)
Dept: DERMATOLOGY | Facility: CLINIC | Age: 39
End: 2024-02-22
Payer: COMMERCIAL

## 2024-02-22 DIAGNOSIS — L40.9 PSORIASIS: Primary | ICD-10-CM

## 2024-02-22 PROCEDURE — 96910 PHOTCHMTX TAR&UVB/PTRLTM&UVB: CPT | Performed by: DERMATOLOGY

## 2024-02-22 NOTE — PROGRESS NOTES
PHOTOTHERAPY    Treatment type: Phototherapy  Visit number: 12  Diagnosis: psoriasis  Anatomical location: Full body  Severity: Severe  BSA: 10%  Treatment schedule: 2x weekly  Reaction: None  Increase %: 10%  mJoules: 327mJ  Extra UVA: No  Max dose: 2000 mJ for body;1000 mJ for face  Topical: Mineral oil  Topical applied by: Patient  Special shield: Ramón  Tech: Janene GAGNON  Comments: next Appt. 2/26/24

## 2024-02-26 ENCOUNTER — OFFICE VISIT (OUTPATIENT)
Dept: DERMATOLOGY | Facility: CLINIC | Age: 39
End: 2024-02-26

## 2024-02-26 DIAGNOSIS — L40.9 PSORIASIS: Primary | ICD-10-CM

## 2024-02-26 NOTE — PROGRESS NOTES
PHOTOTHERAPY    Treatment type: Phototherapy  Visit number: 13  Diagnosis: psoriasis  Anatomical location: Full body  Severity: Severe  BSA: 10%  Treatment schedule: 2x weekly  Reaction: None  Increase %: 10%  mJoules: 361mJ  Extra UVA: No  Max dose: 2000 mJ for body;1000 mJ for face  Topical: Mineral oil  Topical applied by: Patient  Special shield: Gosteven  Tech: Janene NIEVES  Comments: next Appt. 2/29/24

## 2024-02-29 ENCOUNTER — CLINICAL SUPPORT (OUTPATIENT)
Dept: DERMATOLOGY | Facility: CLINIC | Age: 39
End: 2024-02-29
Payer: COMMERCIAL

## 2024-02-29 DIAGNOSIS — L40.9 PSORIASIS: Primary | ICD-10-CM

## 2024-02-29 PROCEDURE — 96910 PHOTCHMTX TAR&UVB/PTRLTM&UVB: CPT | Performed by: DERMATOLOGY

## 2024-02-29 NOTE — PROGRESS NOTES
PHOTOTHERAPY    Treatment type: Phototherapy  Visit number: 14  Diagnosis: psoriasis  Anatomical location: Full body  Severity: Severe  BSA: 10%  Treatment schedule: 2x weekly  Reaction: None  Increase %: 10%  mJoules: 398mJ  Extra UVA: No  Max dose: 2000 mJ for body;1000 mJ for face  Topical: Mineral oil  Topical applied by: Patient  Special shield: Ramón  Tech: Janene NIEVES  Comments: next Appt. 3/4/24

## 2024-03-04 ENCOUNTER — CLINICAL SUPPORT (OUTPATIENT)
Dept: DERMATOLOGY | Facility: CLINIC | Age: 39
End: 2024-03-04
Payer: COMMERCIAL

## 2024-03-04 DIAGNOSIS — L40.9 PSORIASIS: Primary | ICD-10-CM

## 2024-03-04 PROCEDURE — 96910 PHOTCHMTX TAR&UVB/PTRLTM&UVB: CPT | Performed by: DERMATOLOGY

## 2024-03-04 NOTE — PROGRESS NOTES
PHOTOTHERAPY    Treatment type: Phototherapy  Visit number: 15  Diagnosis: Psoriasis  Anatomical location: Full body  Severity: Severe  BSA: 10%  Treatment schedule: 2x weekly  Reaction: None  Increase %: 10%  mJoules: 438mJ  Extra UVA: No  Max dose: 2000mj for body, 1000 mj for face  Topical: Mineral oil  Topical applied by: Patient  Special shield: Ramón  Tech: Kayla Reddy RN  Comments: Next appt: 03/07/2024     Kayla Reddy

## 2024-03-07 ENCOUNTER — CLINICAL SUPPORT (OUTPATIENT)
Dept: DERMATOLOGY | Facility: CLINIC | Age: 39
End: 2024-03-07
Payer: COMMERCIAL

## 2024-03-07 DIAGNOSIS — L40.9 PSORIASIS: Primary | ICD-10-CM

## 2024-03-07 PROCEDURE — 96910 PHOTCHMTX TAR&UVB/PTRLTM&UVB: CPT | Performed by: DERMATOLOGY

## 2024-03-07 NOTE — PROGRESS NOTES
PHOTOTHERAPY    Treatment type: Phototherapy  Visit number: 16  Diagnosis: Psoriasis  Anatomical location: Full body  Severity: Severe  BSA: 10%  Treatment schedule: 2x weekly  Reaction: None  Increase %: 10%  Max dose: 2000mj for body, 1000 mj for face  Topical: Mineral oil  Topical applied by: Patient  Special shield: Goggles  Tech: EZEQUIEL Dougherty  Comments: Next appt 3/12/24    Ricardo Sommer

## 2024-03-19 ENCOUNTER — OFFICE VISIT (OUTPATIENT)
Dept: DERMATOLOGY | Facility: CLINIC | Age: 39
End: 2024-03-19
Payer: COMMERCIAL

## 2024-03-19 DIAGNOSIS — L40.4 GUTTATE PSORIASIS: Primary | ICD-10-CM

## 2024-03-19 PROCEDURE — 96910 PHOTCHMTX TAR&UVB/PTRLTM&UVB: CPT | Performed by: DERMATOLOGY

## 2024-03-19 NOTE — PROGRESS NOTES
PHOTOTHERAPY    Treatment type: Phototherapy  Visit number: 17  Diagnosis: Psoriasis  Anatomical location: Full body  Severity: Severe  BSA: 10%  Treatment schedule: 2x weekly  Reaction: None  Increase %:  (decreased by 15% since willett was serviced and last treated was 11 days ago)  mJoules: 426 mJ  Max dose: 2000mj for body, 1000 mj for face  Topical: Mineral oil  Topical applied by: Patient  Special shield: Goggles  Tech: EZEQUIEL Chahal  Comments: next appt: 3/21/24     Maribel Camargo       Patient discussed with Dr Randhawa IN DEPTH about the AT HOME phototherapy willett.  Dr Randhawa is going to fill out the form and contact [place] [employer] for insurance approval and have them contact the patient.

## 2024-03-21 ENCOUNTER — OFFICE VISIT (OUTPATIENT)
Dept: DERMATOLOGY | Facility: CLINIC | Age: 39
End: 2024-03-21
Payer: COMMERCIAL

## 2024-03-21 ENCOUNTER — TELEPHONE (OUTPATIENT)
Age: 39
End: 2024-03-21

## 2024-03-21 DIAGNOSIS — L40.4 GUTTATE PSORIASIS: Primary | ICD-10-CM

## 2024-03-21 PROCEDURE — 96910 PHOTCHMTX TAR&UVB/PTRLTM&UVB: CPT | Performed by: DERMATOLOGY

## 2024-03-21 NOTE — TELEPHONE ENCOUNTER
Pt called to see if there were any cancellations later today. I advised that at this time I did not have anything later than his 2pm. Pt will keep original appt.

## 2024-03-21 NOTE — PROGRESS NOTES
PHOTOTHERAPY    Treatment type: Phototherapy  Visit number: 18  Diagnosis: Psoriasis  Anatomical location: Full body  Severity: Severe  Treatment schedule: 2x weekly  Reaction: None  mJoules: 491 MJ  Max dose: 2000mj for body, 1000 mj for face  Topical: Mineral oil  Topical applied by: Patient  Special shield: Ramón  Tech: Magdalena NIEVES  Comments: next appt: 3/26/24     Magdalena Zelaya

## 2024-03-26 ENCOUNTER — OFFICE VISIT (OUTPATIENT)
Dept: DERMATOLOGY | Facility: CLINIC | Age: 39
End: 2024-03-26
Payer: COMMERCIAL

## 2024-03-26 DIAGNOSIS — L40.9 PSORIASIS: Primary | ICD-10-CM

## 2024-03-26 PROCEDURE — 96910 PHOTCHMTX TAR&UVB/PTRLTM&UVB: CPT | Performed by: DERMATOLOGY

## 2024-03-26 NOTE — PROGRESS NOTES
PHOTOTHERAPY    Treatment type: Phototherapy  Visit number: 19  Diagnosis: Psoriasis  Anatomical location: Full body  Severity: Severe  Treatment schedule: 2x weekly  Reaction: None  Increase %: 10%  mJoules: 540mJ  Extra UVA: No  Max dose: 2000mj for body, 1000 mj for face  Topical: Mineral oil  Topical applied by: Patient  Special shield: Ramón  Tech: Heather NIEVES  Comments: next appt 03/29/24    Heather Hair

## 2024-03-29 ENCOUNTER — OFFICE VISIT (OUTPATIENT)
Dept: DERMATOLOGY | Facility: CLINIC | Age: 39
End: 2024-03-29
Payer: COMMERCIAL

## 2024-03-29 DIAGNOSIS — L40.9 PSORIASIS: Primary | ICD-10-CM

## 2024-03-29 PROCEDURE — 96910 PHOTCHMTX TAR&UVB/PTRLTM&UVB: CPT | Performed by: DERMATOLOGY

## 2024-03-29 NOTE — PROGRESS NOTES
PHOTOTHERAPY    Treatment type: Phototherapy  Visit number: 20  Diagnosis: Psoriasis  Anatomical location: Full body  Severity: Severe  Treatment schedule: 2x weekly  Reaction: None  Increase %: 10%  mJoules: 595mJ  Extra UVA: No  Max dose: 2000mj for body, 1000 mj for face  Topical: Mineral oil  Topical applied by: Patient  Special shield: Ramón  Tech: KATY Garza  Comments: Next appt: 04/02/2024    Kayla Reddy

## 2024-04-02 ENCOUNTER — OFFICE VISIT (OUTPATIENT)
Dept: DERMATOLOGY | Facility: CLINIC | Age: 39
End: 2024-04-02
Payer: COMMERCIAL

## 2024-04-02 DIAGNOSIS — L40.4 GUTTATE PSORIASIS: Primary | ICD-10-CM

## 2024-04-02 PROCEDURE — 96910 PHOTCHMTX TAR&UVB/PTRLTM&UVB: CPT | Performed by: STUDENT IN AN ORGANIZED HEALTH CARE EDUCATION/TRAINING PROGRAM

## 2024-04-02 NOTE — PROGRESS NOTES
PHOTOTHERAPY    Treatment type: Phototherapy  Visit number: 21  Diagnosis: Psoriasis  Anatomical location: Full body  Severity: Severe  Treatment schedule: 2x weekly  Reaction: None  Increase %: 10%  mJoules: 656 mJ  Extra UVA: No  Max dose: 2000mj for body, 1000 mj for face  Topical: Mineral oil  Topical applied by: Patient  Special shield: Goggles  Tech: EZEQUIEL Chahal  Comments: next appt 4/4/24     Maribel Camargo

## 2024-04-04 ENCOUNTER — OFFICE VISIT (OUTPATIENT)
Dept: DERMATOLOGY | Facility: CLINIC | Age: 39
End: 2024-04-04
Payer: COMMERCIAL

## 2024-04-04 DIAGNOSIS — L40.4 GUTTATE PSORIASIS: Primary | ICD-10-CM

## 2024-04-04 PROCEDURE — 96910 PHOTCHMTX TAR&UVB/PTRLTM&UVB: CPT | Performed by: STUDENT IN AN ORGANIZED HEALTH CARE EDUCATION/TRAINING PROGRAM

## 2024-04-04 NOTE — PROGRESS NOTES
PHOTOTHERAPY    Treatment type: Phototherapy  Visit number: 22  Diagnosis: Psoriasis  Anatomical location: Full body  Severity: Severe  Treatment schedule: 2x weekly  Reaction: None  Increase %: 10%  mJoules: 726 mJ  Extra UVA: No  Max dose: 2000mj for body, 1000 mj for face  Topical: Mineral oil  Topical applied by: Patient  Special shield: Goggles  Tech: EZEQUIEL Chahal  Comments: next appt 4/8/24     Maribel Camargo

## 2024-04-05 NOTE — PROGRESS NOTES
Patient seen with medical staff. While I did not see the patient, I was available for any questions that should arise.   Daniel Andrade MD

## 2024-04-08 ENCOUNTER — OFFICE VISIT (OUTPATIENT)
Dept: DERMATOLOGY | Facility: CLINIC | Age: 39
End: 2024-04-08
Payer: COMMERCIAL

## 2024-04-08 DIAGNOSIS — L40.4 GUTTATE PSORIASIS: Primary | ICD-10-CM

## 2024-04-08 PROCEDURE — 96910 PHOTCHMTX TAR&UVB/PTRLTM&UVB: CPT | Performed by: DERMATOLOGY

## 2024-04-09 ENCOUNTER — TELEPHONE (OUTPATIENT)
Age: 39
End: 2024-04-09

## 2024-04-09 NOTE — TELEPHONE ENCOUNTER
Patient called to schedule phototherapy on 04/22 in the morning because we will be out of the town for 3 days , he would like to know if there is any possibility to ob for him on 04/22

## 2024-04-12 ENCOUNTER — OFFICE VISIT (OUTPATIENT)
Dept: DERMATOLOGY | Facility: CLINIC | Age: 39
End: 2024-04-12

## 2024-04-12 DIAGNOSIS — L40.9 PSORIASIS: Primary | ICD-10-CM

## 2024-04-12 NOTE — PROGRESS NOTES
PHOTOTHERAPY    Treatment type: Phototherapy  Visit number: 24  Diagnosis: Psoriasis  Anatomical location: Full body  Severity: Severe  Treatment schedule: 2x weekly  Reaction: None  Increase %: 10%  mJoules: 879mJ  Extra UVA: No  Max dose: 2000mj for body, 1000 mj for face  Topical: Mineral oil  Topical applied by: Patient  Special shield: Ramón  Tech: KATY Garza  Comments: Next appt: 04/16/2024    Kayla Reddy

## 2024-04-16 ENCOUNTER — OFFICE VISIT (OUTPATIENT)
Dept: DERMATOLOGY | Facility: CLINIC | Age: 39
End: 2024-04-16

## 2024-04-16 DIAGNOSIS — L40.9 PSORIASIS: Primary | ICD-10-CM

## 2024-04-16 NOTE — PROGRESS NOTES
PHOTOTHERAPY    Treatment type: Phototherapy  Visit number: 25  Diagnosis: Psoriasis  Anatomical location: Full body  Severity: Severe  Treatment schedule: 2x weekly  Reaction: None  Increase %: 10%  mJoules: 967 mJ  Extra UVA: No  Max dose: 2000mj for body, 1000 mj for face  Topical: Mineral oil  Topical applied by: Patient  Special shield: Goggles  Tech: EZEQUIEL Crawford  Comments: Next appt: 04/18/2024     Ree Miner

## 2024-04-18 ENCOUNTER — OFFICE VISIT (OUTPATIENT)
Dept: DERMATOLOGY | Facility: CLINIC | Age: 39
End: 2024-04-18

## 2024-04-18 DIAGNOSIS — L40.9 PSORIASIS: Primary | ICD-10-CM

## 2024-04-18 NOTE — PROGRESS NOTES
PHOTOTHERAPY    Treatment type: Phototherapy  Visit number: 26  Diagnosis: Psoriasis  Anatomical location: Full body  Severity: Severe  Treatment schedule: 2x weekly  Reaction: None  Increase %: 10%  mJoules: 1000 mJ  Extra UVA: No  Max dose: 2000mj for body, 1000 mj for face  Topical: Mineral oil  Topical applied by: Patient  Special shield: Ramón  Tech: EZEQUIEL Richards  Comments: Next Appnt: 04/22/2024    Susan Cochran

## 2024-04-20 NOTE — PROGRESS NOTES
Patient seen with medical staff. While I did not see the patient, I was available for any questions that should arise.   Daniel Andrade MD    DOS 04/16/2024

## 2024-04-20 NOTE — PROGRESS NOTES
Patient seen with medical staff. While I did not see the patient, I was available for any questions that should arise.   Daniel Andrade MD    DOS 04/18/2024

## 2024-04-22 ENCOUNTER — OFFICE VISIT (OUTPATIENT)
Dept: DERMATOLOGY | Facility: CLINIC | Age: 39
End: 2024-04-22
Payer: COMMERCIAL

## 2024-04-22 DIAGNOSIS — L40.4 GUTTATE PSORIASIS: Primary | ICD-10-CM

## 2024-04-22 PROCEDURE — 96910 PHOTCHMTX TAR&UVB/PTRLTM&UVB: CPT | Performed by: DERMATOLOGY

## 2024-04-22 NOTE — PROGRESS NOTES
PHOTOTHERAPY    Treatment type: Phototherapy  Visit number: 27  Diagnosis: Psoriasis  Anatomical location: Full body  Severity: Severe  Treatment schedule: 2x weekly  Reaction: None  Increase %: 10%  mJoules: 1101 mJ  Extra UVA: No  Max dose: 2000mj for body, 1000 mj for face  Topical: Mineral oil  Topical applied by: Patient  Special shield: Goggles  Tech: EZEQUIEL Chahal  Comments: next appt 4/25/24     Maribel Camargo

## 2024-04-25 ENCOUNTER — OFFICE VISIT (OUTPATIENT)
Dept: DERMATOLOGY | Facility: CLINIC | Age: 39
End: 2024-04-25
Payer: COMMERCIAL

## 2024-04-25 DIAGNOSIS — L40.4 GUTTATE PSORIASIS: Primary | ICD-10-CM

## 2024-04-25 PROCEDURE — 96910 PHOTCHMTX TAR&UVB/PTRLTM&UVB: CPT | Performed by: DERMATOLOGY

## 2024-04-25 NOTE — PROGRESS NOTES
PHOTOTHERAPY    Treatment type: Phototherapy  Visit number: 28  Diagnosis: Psoriasis  Anatomical location: Full body  Severity: Severe  Treatment schedule: 2x weekly  Reaction: None  Increase %: 10%  mJoules: 1211 mJ  Extra UVA: No  Max dose: 2000mj for body, 1000 mj for face  Topical: Mineral oil  Topical applied by: Patient  Special shield: Goggles  Tech: EZEQUIEL Chahal  Comments: next appt: unknown     Maribel Camargo

## 2024-04-26 ENCOUNTER — TELEPHONE (OUTPATIENT)
Dept: DERMATOLOGY | Facility: CLINIC | Age: 39
End: 2024-04-26

## 2024-04-26 ENCOUNTER — TELEPHONE (OUTPATIENT)
Age: 39
End: 2024-04-26

## 2024-04-26 NOTE — TELEPHONE ENCOUNTER
Patient was in yesterday and told to call to schedule phototherapy.  Pod does not have permission to schedule phototherapy per the following message.        Patient was very upset .  Forwarding to sharon.  Please call to schedule

## 2024-04-26 NOTE — TELEPHONE ENCOUNTER
Called to schedule patient for phototherapy but he told me he was already scheduled. He also updated me that he was approved for a home willett and will cancel his future appointments once it is delivered.

## 2024-04-26 NOTE — TELEPHONE ENCOUNTER
Pt called back upset, said he was calling to schedule phototherapy and said he was hung up on. Was able schedule a few phototherapy appts.

## 2024-04-29 ENCOUNTER — CLINICAL SUPPORT (OUTPATIENT)
Dept: DERMATOLOGY | Facility: CLINIC | Age: 39
End: 2024-04-29

## 2024-04-29 DIAGNOSIS — L40.4 GUTTATE PSORIASIS: Primary | ICD-10-CM

## 2024-04-29 NOTE — PROGRESS NOTES
PHOTOTHERAPY    Treatment type: Phototherapy  Visit number: 29  Diagnosis: Psoriasis  Anatomical location: Full body  Severity: Severe  Treatment schedule: 2x weekly  Reaction: None  Increase %: 10%  mJoules: 1332mJ  Extra UVA: No  Max dose: 2000mj for body, 1000 mj for face  Topical: Mineral oil  Topical applied by: Patient  Special shield: Ramón  Tech: KATY Garza  Comments: Next appt: 05/02/2024      Kayla Reddy

## 2024-04-29 NOTE — TELEPHONE ENCOUNTER
Rec'd Request for Correction on Home Phototherapy     Scanned docs and sent to Kayla Hart and Matthew Arzola

## 2024-05-01 NOTE — TELEPHONE ENCOUNTER
Received fax requesting CPT Code Change for Home Phototherapy, patients insurance will not cover . Please complete form, sign, date and Fax back to [phone]. Form scanned into [employer] for review.

## 2024-05-02 ENCOUNTER — OFFICE VISIT (OUTPATIENT)
Dept: DERMATOLOGY | Facility: CLINIC | Age: 39
End: 2024-05-02

## 2024-05-02 DIAGNOSIS — L40.9 PSORIASIS: Primary | ICD-10-CM

## 2024-05-02 NOTE — PROGRESS NOTES
PHOTOTHERAPY    Treatment type: Phototherapy  Visit number: 30  Diagnosis: Psoriasis  Anatomical location: Full body  Severity: Severe  Treatment schedule: 2x weekly  Reaction: None  Increase %: 10%  mJoules: 1465mj  Extra UVA: No  Max dose: 2000mj for body, 1000 mj for face  Topical: Mineral oil  Topical applied by: Patient  Special shield: Ramón  Tech: EZEQUIEL Blake  Comments: Next Appnt: 05/07/2024

## 2024-05-07 ENCOUNTER — CLINICAL SUPPORT (OUTPATIENT)
Dept: DERMATOLOGY | Facility: CLINIC | Age: 39
End: 2024-05-07
Payer: COMMERCIAL

## 2024-05-07 ENCOUNTER — TELEPHONE (OUTPATIENT)
Age: 39
End: 2024-05-07

## 2024-05-07 DIAGNOSIS — L40.4 GUTTATE PSORIASIS: Primary | ICD-10-CM

## 2024-05-07 PROCEDURE — 96910 PHOTCHMTX TAR&UVB/PTRLTM&UVB: CPT | Performed by: STUDENT IN AN ORGANIZED HEALTH CARE EDUCATION/TRAINING PROGRAM

## 2024-05-07 NOTE — TELEPHONE ENCOUNTER
Cristina from [employer] called to let us know that the at home laser equipment is not covered by patient's insurance , she needs a form to be filled out , she will faxed it over to the office .

## 2024-05-07 NOTE — PROGRESS NOTES
PHOTOTHERAPY    Treatment type: Phototherapy  Visit number: 31  Diagnosis: Psoriasis  Anatomical location: Full body  Severity: Severe  Treatment schedule: 2x weekly  Reaction: None  Increase %: 10%  mJoules: 1465mj  Extra UVA: No  Max dose: 2000mj body, 1000 for face  Topical: Mineral oil  Topical applied by: Patient  Special shield: Ramón  Tech: Annelise Real  Comments: Next Appt 5/9/2024

## 2024-05-08 NOTE — TELEPHONE ENCOUNTER
Cristina called again she needs a correction ; we just need to change the cpt code in order to get the equipment covered by insurance .   She will resend the form to our ooffice .   Please send it back as soon as possible

## 2024-05-09 ENCOUNTER — OFFICE VISIT (OUTPATIENT)
Dept: DERMATOLOGY | Facility: CLINIC | Age: 39
End: 2024-05-09

## 2024-05-09 DIAGNOSIS — L40.4 GUTTATE PSORIASIS: Primary | ICD-10-CM

## 2024-05-09 NOTE — PROGRESS NOTES
PHOTOTHERAPY    Treatment type: Phototherapy  Visit number: 32  Diagnosis: Psoriasis  Anatomical location: Full body  Severity: Severe  Treatment schedule: 2x weekly  Reaction: None  Increase %: 10%  mJoules: 1613 mJ  Extra UVA: No  Max dose: 2000mj body, 1000 for face  Topical: Mineral oil  Topical applied by: Patient  Special shield: Goggles  Tech: EZEQUIEL Chahal  Comments: next appt 5/14/24     Maribel Camargo

## 2024-05-14 ENCOUNTER — CLINICAL SUPPORT (OUTPATIENT)
Dept: DERMATOLOGY | Facility: CLINIC | Age: 39
End: 2024-05-14

## 2024-05-14 DIAGNOSIS — L40.4 GUTTATE PSORIASIS: Primary | ICD-10-CM

## 2024-05-14 NOTE — PROGRESS NOTES
PHOTOTHERAPY    Treatment type: Phototherapy  Visit number: 33  Diagnosis: Psoriasis  Anatomical location: Full body  Severity: Severe  Treatment schedule: 2x weekly  Reaction: Other (slight feeling of sunburn without redness)  Increase %: 10%  mJoules: 1613 mJ (Holding dose today since felt some tenderness in a few areas but had no sunburn redness.)  Extra UVA: No  Max dose: 2000mj body, 1000 for face  Topical: Mineral oil  Topical applied by: Patient  Special shield: Goggles  Tech: EZEQUIEL Chahal  Comments: Next appt: 5/16/24     Maribel Camargo

## 2024-05-16 ENCOUNTER — CLINICAL SUPPORT (OUTPATIENT)
Dept: DERMATOLOGY | Facility: CLINIC | Age: 39
End: 2024-05-16

## 2024-05-16 DIAGNOSIS — L40.9 PSORIASIS: Primary | ICD-10-CM

## 2024-05-16 NOTE — PROGRESS NOTES
PHOTOTHERAPY    Treatment type: Phototherapy  Visit number: 34  Diagnosis: Psoriasis  Anatomical location: Full body  Severity: Severe  BSA: 10%  Treatment schedule: 2x weekly  Reaction: Other (slight feeling of sunburn, not as bad as last time)  Increase %: Other (held at last dose)  mJoules: 1614 mJ  Extra UVA: No  Max dose: 2000mj body, 1000 for face  Topical: Mineral oil  Topical applied by: Patient  Special shield: Goggles  Tech: EZEQUIEL Kerr  Comments: Next appt 5/21/24

## 2024-05-21 ENCOUNTER — OFFICE VISIT (OUTPATIENT)
Dept: DERMATOLOGY | Facility: CLINIC | Age: 39
End: 2024-05-21
Payer: COMMERCIAL

## 2024-05-21 DIAGNOSIS — L40.9 PSORIASIS: Primary | ICD-10-CM

## 2024-05-21 PROCEDURE — 96910 PHOTCHMTX TAR&UVB/PTRLTM&UVB: CPT | Performed by: DERMATOLOGY

## 2024-05-21 NOTE — PROGRESS NOTES
PHOTOTHERAPY    Treatment type: Phototherapy  Visit number: 35  Diagnosis: Psoriasis  Anatomical location: Full body  Severity: Severe  BSA: 10%  Treatment schedule: 2x weekly  Reaction: Other (patient stated he still felt sunburnt last treatment)  Increase %: Other (descrease by 10% due to patient feeling sunburnt)  Extra UVA: No  Max dose: 2000mj body, 1000 for face  Topical: Mineral oil  Topical applied by: Patient  Special shield: Goggles  Tech: Mariana NIEVES  Comments: Next appt 05/23/24           Patient stated he still felt sunburn after holding dose last treatment on 05/16/24 for about 3 days. Patient wished to decrease by 10%.

## 2024-05-23 ENCOUNTER — OFFICE VISIT (OUTPATIENT)
Dept: DERMATOLOGY | Facility: CLINIC | Age: 39
End: 2024-05-23

## 2024-05-23 DIAGNOSIS — L40.9 PSORIASIS: Primary | ICD-10-CM

## 2024-05-23 NOTE — PROGRESS NOTES
[facility] Dermatology Clinic Note     Patient Name: Francisco Hampton  Encounter Date: ***     Have you been cared for by a [facility] Dermatologist in the last 3 years and, if so, which description applies to you?    {Dermclinicnote_seeninlast3years:68909}    Whom besides the patient is providing clinical information about today's encounter?   {DERM Element #2_Assessment Requiring an Independent Historian (Amount or Complexity of Data to be Reviewed and Analyzed):81552}    Physical Exam and Assessment/Plan by Diagnosis:

## 2024-05-23 NOTE — PROGRESS NOTES
PHOTOTHERAPY    Treatment type: Phototherapy  Visit number: 36  Diagnosis: Psoriasis  Anatomical location: Full body  Severity: Severe  BSA: 10%  Treatment schedule: 2x weekly  Reaction: None  Increase %: 10%  mJoules: 1615 mJ  Extra UVA: No  Max dose: 2000mj body, 1000 for face  Topical: Mineral oil  Topical applied by: Patient  Special shield: Ramón  Tech: Mariana NIEVES  Comments: Next appt: 05/30/24       Ree Miner

## 2024-05-30 ENCOUNTER — OFFICE VISIT (OUTPATIENT)
Dept: DERMATOLOGY | Facility: CLINIC | Age: 39
End: 2024-05-30

## 2024-05-30 DIAGNOSIS — L40.9 PSORIASIS: Primary | ICD-10-CM

## 2024-05-30 NOTE — PROGRESS NOTES
PHOTOTHERAPY    Treatment type: Phototherapy  Visit number: 37  Diagnosis: Psoriasis  Anatomical location: Full body  Severity: Severe  BSA: 10%  Treatment schedule: 2x weekly  Reaction: None  Increase %: 10%  mJoules: 1617 mJ (I kept the patient on the previous dosage ( 1615 mJ) due to his one week lapse in treatment. He covered his face with a towel.)  Extra UVA: No  Max dose: 2000mj body, 1000 for face ( Pt covered his face)  Topical: Mineral oil  Topical applied by: Patient  Special shield: Ramón  Tech: EZEQUIEL Haas  Comments:  (The patient opted not to book another appointment because he believes the current treatment is not effective. Instead, he scheduled a follow up appointment with Dr. Vargas to explore alternative treatment options.)

## 2024-07-02 ENCOUNTER — NURSE TRIAGE (OUTPATIENT)
Age: 39
End: 2024-07-02

## 2024-07-02 NOTE — TELEPHONE ENCOUNTER
Yusra calling back as pt has been following up to request home phototherapy unit. Reviewed she was told pt wasn't scheduling phototherapy at this time due to pt stating it is not effective. Attempted to refax last phototherapy note per request to [phone] and relayed plan per providers is to wait to provide follow up information until 7/18 ov.

## 2024-07-02 NOTE — TELEPHONE ENCOUNTER
"Yusra from [facility] called in requesting additional clinical information r/t phototherapy for psoriasis. LOV note faxed. No further action needed at this time.     Reason for Disposition   Information only question and nurse able to answer    Answer Assessment - Initial Assessment Questions  1. REASON FOR CALL or QUESTION: \"What is your reason for calling today?\" or \"How can I best help you?\" or \"What question do you have that I can help answer?\"      Requesting additional clinical information for phototherapy    Protocols used: Information Only Call - No Triage-ADULT-[place]    "

## 2024-07-02 NOTE — TELEPHONE ENCOUNTER
"Yusra calling to f/u re attempts of getting home phototherapy unit approved. States she did not receive last office visit notes that were previously faxed. Refaxed 2/15/24 ov with Dr. Vargas note to [phone] via [employer]. States she does also need LMN/ or clarification on treatment plan/how many more tx he will need and if the phototherapy is working sent to [phone] (noted last ov note does include \"He reports mild improvement. \" \"Continue Phototherapy treatments Monitor for improvement\")  "

## 2024-07-02 NOTE — TELEPHONE ENCOUNTER
Pt warm transferred from [place] to this CTS-RN.    Yusra from [facility] calling back stating that the fax she received was incomplete. LOV note refaxed over. No further action needed at this time.

## 2024-07-05 NOTE — TELEPHONE ENCOUNTER
Cristina from [facility] called to let us know that the new at home phototherapy was approved but has a copay of $1000 and patient told them that he can't not afford it .   For additional questions reach out to  [phone]

## 2024-07-17 ENCOUNTER — TELEPHONE (OUTPATIENT)
Age: 39
End: 2024-07-17

## 2024-07-18 ENCOUNTER — OFFICE VISIT (OUTPATIENT)
Dept: DERMATOLOGY | Facility: CLINIC | Age: 39
End: 2024-07-18
Payer: COMMERCIAL

## 2024-07-18 VITALS — BODY MASS INDEX: 31.34 KG/M2 | WEIGHT: 195 LBS | HEIGHT: 66 IN

## 2024-07-18 DIAGNOSIS — L40.4 GUTTATE PSORIASIS: Primary | ICD-10-CM

## 2024-07-18 DIAGNOSIS — L81.0 POST-INFLAMMATORY HYPERPIGMENTATION: ICD-10-CM

## 2024-07-18 PROCEDURE — 99213 OFFICE O/P EST LOW 20 MIN: CPT | Performed by: DERMATOLOGY

## 2024-07-18 NOTE — PATIENT INSTRUCTIONS
Assessment and Plan:  Based on a thorough discussion of this condition and the management approach to it (including a comprehensive discussion of the known risks, side effects and potential benefits of treatment), the patient (family) agrees to implement the following specific plan:  Phototherapy no longer needed at this time   Otezla not needed at this time   Follow up as needed

## 2024-07-18 NOTE — PROGRESS NOTES
"[facility] Dermatology Clinic Note     Patient Name: Francisco Hampton  Encounter Date: 07/18/2024     Have you been cared for by a [facility] Dermatologist in the last 3 years and, if so, which description applies to you?    Yes.  I have been here within the last 3 years, and my medical history has NOT changed since that time.  I am MALE/not capable of bearing children.    REVIEW OF SYSTEMS:  Have you recently had or currently have any of the following? No changes in my recent health.   PAST MEDICAL HISTORY:  Have you personally ever had or currently have any of the following?  If \"YES,\" then please provide more detail. No changes in my medical history.   HISTORY OF IMMUNOSUPPRESSION: Do you have a history of any of the following:  Systemic Immunosuppression such as Diabetes, Biologic or Immunotherapy, Chemotherapy, Organ Transplantation, Bone Marrow Transplantation?  No     Answering \"YES\" requires the addition of the dotphrase \"IMMUNOSUPPRESSED\" as the first diagnosis of the patient's visit.   FAMILY HISTORY:  Any \"first degree relatives\" (parent, brother, sister, or child) with the following?    No changes in my family's known health.   PATIENT EXPERIENCE:    Do you want the Dermatologist to perform a COMPLETE skin exam today including a clinical examination under the \"bra and underwear\" areas?  NO  If necessary, do we have your permission to call and leave a detailed message on your Preferred Phone number that includes your specific medical information?  Yes      No Known Allergies   Current Outpatient Medications:     Multiple Vitamins-Minerals (multivitamin with minerals) tablet, Take 1 tablet by mouth daily, Disp: , Rfl:     omeprazole (PriLOSEC) 20 mg delayed release capsule, Take 20 mg by mouth daily, Disp: , Rfl:     triamcinolone (KENALOG) 0.1 % cream, Apply topically 2 (two) times a day (Patient not taking: Reported on 7/18/2024), Disp: 453.6 g, Rfl: 2          Whom besides the patient is providing clinical " information about today's encounter?   NO ADDITIONAL HISTORIAN (patient alone provided history)    Physical Exam and Assessment/Plan by Diagnosis:    GUTTATE PSORIASIS FOLLOW UP - resolved     Physical Exam: No primary lesions today     Additional History of Present Condition:  patient is here for follow up on psoriasis. Patient stopped phototherapy on 05/30/2024 after 5 months on phototherapy. Patient states it was not working, patient states after he stopped his legs improved. Patient was using Triamcinolone twice daily but stopped using it 3 months ago due to no improvement.  Patient denies any painful or itchy spots.     Assessment and Plan:  Based on a thorough discussion of this condition and the management approach to it (including a comprehensive discussion of the known risks, side effects and potential benefits of treatment), the patient (family) agrees to implement the following specific plan:  Phototherapy and otezla no longer needed at this time since psoriasis has resolved   Otezla not needed at this time   Follow up as needed      POST-INFLAMMATORY HYPERPIGMENTATION  Physical Exam:  Anatomic Location Affected:  trunk and extremities  Morphological Description:  hyperpigmented macules and patches       Additional History of Present Condition:  asymptomatic; 2/2 psoriasis     Assessment and Plan:  Based on a thorough discussion of this condition and the management approach to it (including a comprehensive discussion of the known risks, side effects and potential benefits of treatment), the patient (family) agrees to implement the following specific plan:  Counseled patient that this will resolve with time and can take several months. This is an expected course of the condition. Counseled about regular SPF 30 or higher use to prevent further darkening.     Scribe Attestation      I,:  Ree Miner am acting as a scribe while in the presence of the attending physician.:       I,:  Shadi Jones MD  personally performed the services described in this documentation    as scribed in my presence.:

## 2025-01-22 NOTE — PROGRESS NOTES
[facility]  Annual Physical & Office Visit     PATIENT INFORMATION   Name: Francisco Hampton   YOB: 1985   MRN: [id number]  Encounter Provider: Michelet Benitez MD    Encounter Date: 1/23/2025    ASSESSMENT & PLAN     Assessment & Plan  Annual physical exam  Healthcare Maintenance  Health maintenance completed today  - Medical history reviewed, including existing medical conditions, medications, and surgeries.   - Labs discussed to evaluate cholesterol, blood sugar, kidney function, liver function, and other important markers of health.  - BMI evaluated and discussed.  - Lifestyle and health counseling completed including diet, exercise habits, smoking status, alcohol consumption.   - Bone & Heart health reviewed  - Cancer screenings discussed: Mammogram/Pap smear/CT lung/colonoscopy.   - Vaccination status reviewed and pertinent immunizations and booster shots discussed.  - Skin examination: Discussed importance of sunscreen and other preventative measures for skin cancer.  - Mental health and wellbeing evaluated and discussed.  - Family history obtained to identify any of hereditary health risks.  Lab orders in place as discussed  Start/continue preventative measures as discussed/advised  Complete preventative orders in place as recommended.   Refer to screenings problem list   Orders:  •  Comprehensive metabolic panel  •  CBC and differential    Panic attack  Currently not prescribed any meds.   Reports concerns. Having near panic attacks and has used xanax which really helped. As has used Ashwaghanda which kept him calm over all.    Assessment: Trying something less intense of xanax would be a better plan and if noticing anxiety is worsening may message about a daily regimen. Would recommend restarting Ashwaghanda.   Med changes: Yes, atarax 25 mg prn for anxiety .     Advised on the importance of building and maintaining good coping mechanisms with recommendations as discussed  including  Yoga, recommend 3-4x/week  Breath-work/Meditation  Journalling, consistently.   Exercising/walking consistently.  Getting good quality sleep.  Return for annual physical in one year.    Orders:  •  TSH, 3rd generation with Free T4 reflex; Future  •  hydrOXYzine HCL (ATARAX) 25 mg tablet; Take 1 tablet (25 mg total) by mouth every 6 (six) hours as needed for anxiety    Vitamin D insufficiency    Orders:  •  Vitamin D 25 hydroxy    Encounter for lipid screening for cardiovascular disease    Orders:  •  Lipid Panel with Direct LDL reflex    Dyslipidemia    Orders:  •  Lipid Panel with Direct LDL reflex  •  Apolipoprotein B  •  Lipoprotein A (LPA)    Vasectomy evaluation    Orders:  •  Ambulatory referral to Urology; Future    Obesity, Class I, BMI 30-34.9  Wt Readings from Last 3 Encounters:   01/23/25 87.8 kg (193 lb 9.6 oz)   07/18/24 88.5 kg (195 lb)   02/15/24 88.9 kg (196 lb)     Initial weight (01/23/25): 193 lbs, Body mass index is 30.78 kg/m².   TWL: - lbs  Currently working on lifestyle improvement only.   Reports no concerns.   Assessment: Needs to work on nutritional intake, incorporating more movement throughout the day, and building consistency with a regular exercise routine  Med management:None. Continue working on lifestyle only.   Recommended focusing on building good habits over time by setting and being consistent with realistic goals.   Improve nutritional intake (recommended Mediterranean diet, low-carb diet)  Calorie control (creating a modest calorie deficit of 500-1000 -calorie/day)  Monitoring portion size and frequency of intake, limiting snacking as discussed  Aiming for Whole Foods and healthy fats  Limiting added sugars and processed foods as discussed  Exercising and move body throughout the day and regularly as discussed  Prioritize sleep and mental health  Return for annual physical in one year.           Hyperpigmentation  Phototherapy did not help. Dr. Randhawa, at the time a  "resident, reported it is hyperpigmentation and it will come and go and he would not recommend phototherapy. Pt stopped and it had improved mostly. Would like to establish with Edis.           COUNSELING    Alcohol/drug use: discussed moderation in alcohol intake, the recommendations for healthy alcohol use, and avoidance of illicit drug use.  Dental Health: discussed importance of regular tooth brushing, flossing, and dental visits.  Injury prevention: discussed safety/seat belts, safety helmets, smoke detectors, carbon monoxide detectors, and smoking near bedding or upholstery.  Sexual health: discussed sexually transmitted diseases, partner selection, use of condoms, avoidance of unintended pregnancy, and contraceptive alternatives.  Exercise: the importance of regular exercise/physical activity was discussed. Recommend exercise 3-5 times per week for at least 30 minutes.     Depression Screening and Follow-up Plan: Patient was screened for depression during today's encounter. They screened negative with a PHQ-2 score of 0.      HEALTH MAINTENANCE     Immunization History   Administered Date(s) Administered   • COVID-19 J&J (GlassUp) vaccine 0.5 mL 09/20/2021     Colonoscopy: Not on file Not on file  Cologuard: Not on file   CT lung:    Prostate: No results found for: \"PSA\"    FOLLOW UP   Return in about 1 year (around 1/23/2026) for Annual physical.    CURRENT MEDICATIONS     Current Outpatient Medications:   •  hydrOXYzine HCL (ATARAX) 25 mg tablet, Take 1 tablet (25 mg total) by mouth every 6 (six) hours as needed for anxiety, Disp: 30 tablet, Rfl: 0  •  Multiple Vitamins-Minerals (multivitamin with minerals) tablet, Take 1 tablet by mouth daily, Disp: , Rfl:   •  omeprazole (PriLOSEC) 20 mg delayed release capsule, Take 20 mg by mouth daily, Disp: , Rfl:     ANNUAL PHYSICAL QUESTIONNAIRE    History of Present Illness     Francisco Hampton is a 39 y.o. who is here for annual physical exam.  History obtained from : " patient  HPI    Concerns today: yes    LIFESTYLE  Nutritional intake: poor diet  Plain water hydration: good  Exercise and Movement: getting above 10,000 steps per day and no formal exercise  Do you struggle with weight?  no  Sleep: gets 7-8 hours of sleep on average     MENTAL HEALTH  PHQ-2/9 Depression Screening    Little interest or pleasure in doing things: 0 - not at all  Feeling down, depressed, or hopeless: 0 - not at all  PHQ-2 Score: 0  PHQ-2 Interpretation: Negative depression screen       Anxiety: yes  History of SI/SH: no  Significant past trauma that has impacted patient's mental health: no  Coping mechanisms with life's worries and obstacles: yes    VISION, HEARING, DENTAL HEALTH  Hearing: normal - bilateral  Vision: goes for regular eye exams and wears contacts  Dental: regular dental visits and brushes teeth twice daily    SUBSTANCE USE  Alcohol consumption: Yes Moderate  Smoking Cig: non-smoker  Vaping: no  Current use of recreational drugs: yes marijuana  History of substance use: no     MEN'S HEALTH  Follow urology: no  Sexually active: Yes - single partner - female  Do you have any problems with urination? no      FAMILY HISTORY, RISK FACTORS  Do you have any health concerns based on your family history or your risk factors? no    PATIENT AWARENESS   Do you know what medications you are on and why? yes  Do you check your blood pressures at home? yes   Do you track your health metrics? yes    QUALITY OF LIFE   Do you have hobbies you enjoy? yes  Do you have a support system? 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     Past Surgical History:   Procedure Laterality Date   • HERNIA REPAIR         FAMILY HISTORY & SOCIAL HISTORY      Family History   Problem Relation Age of Onset   • Hypertension Mother    • Glaucoma Mother      "  Social History     Tobacco Use   • Smoking status: Never   • Smokeless tobacco: Never   Vaping Use   • Vaping status: Never Used   Substance and Sexual Activity   • Alcohol use: Yes     Alcohol/week: 3.0 standard drinks of alcohol     Types: 3 Cans of beer per week     Comment: weekly   • Drug use: Yes     Frequency: 3.0 times per week     Types: Marijuana     Comment: biweekly   • Sexual activity: Yes     Partners: Female     Birth control/protection: None        OBJECTIVES      /70 (BP Location: Left arm)   Pulse 75   Temp (!) 96.3 °F (35.7 °C) (Tympanic)   Ht 5' 6.5\" (1.689 m)   Wt 87.8 kg (193 lb 9.6 oz)   SpO2 98%   BMI 30.78 kg/m²    Physical Exam  Vitals reviewed.   Constitutional:       General: He is not in acute distress.     Appearance: Normal appearance. He is obese. He is not ill-appearing, toxic-appearing or diaphoretic.   HENT:      Head: Normocephalic and atraumatic.      Right Ear: External ear normal.      Left Ear: External ear normal.      Nose: Nose normal.      Mouth/Throat:      Mouth: Mucous membranes are moist.   Eyes:      General: No scleral icterus.        Right eye: No discharge.         Left eye: No discharge.      Extraocular Movements: Extraocular movements intact.      Conjunctiva/sclera: Conjunctivae normal.   Cardiovascular:      Rate and Rhythm: Normal rate and regular rhythm.      Pulses: Normal pulses.      Heart sounds: Normal heart sounds.   Pulmonary:      Effort: Pulmonary effort is normal. No respiratory distress.      Breath sounds: Normal breath sounds.   Abdominal:      Palpations: Abdomen is soft.      Tenderness: There is no abdominal tenderness.   Musculoskeletal:         General: No swelling. Normal range of motion.      Cervical back: Normal range of motion.   Skin:     General: Skin is warm and dry.   Neurological:      General: No focal deficit present.      Mental Status: He is alert and oriented to person, place, and time.   Psychiatric:         " Mood and Affect: Mood normal.         Behavior: Behavior normal.         Thought Content: Thought content normal.           Michelet Benitez MD  Family Medicine Physician   [facility] [place]      Administrative Statements     Medications have been reviewed by provider in current encounter

## 2025-01-23 ENCOUNTER — OFFICE VISIT (OUTPATIENT)
Age: 40
End: 2025-01-23
Payer: COMMERCIAL

## 2025-01-23 VITALS
DIASTOLIC BLOOD PRESSURE: 70 MMHG | BODY MASS INDEX: 30.39 KG/M2 | OXYGEN SATURATION: 98 % | HEIGHT: 67 IN | WEIGHT: 193.6 LBS | HEART RATE: 75 BPM | TEMPERATURE: 96.3 F | SYSTOLIC BLOOD PRESSURE: 106 MMHG

## 2025-01-23 DIAGNOSIS — Z30.09 VASECTOMY EVALUATION: ICD-10-CM

## 2025-01-23 DIAGNOSIS — Z13.6 ENCOUNTER FOR LIPID SCREENING FOR CARDIOVASCULAR DISEASE: ICD-10-CM

## 2025-01-23 DIAGNOSIS — F41.0 PANIC ATTACK: ICD-10-CM

## 2025-01-23 DIAGNOSIS — Z13.220 ENCOUNTER FOR LIPID SCREENING FOR CARDIOVASCULAR DISEASE: ICD-10-CM

## 2025-01-23 DIAGNOSIS — E55.9 VITAMIN D INSUFFICIENCY: ICD-10-CM

## 2025-01-23 DIAGNOSIS — Z00.00 ANNUAL PHYSICAL EXAM: Primary | ICD-10-CM

## 2025-01-23 DIAGNOSIS — E66.811 OBESITY, CLASS I, BMI 30-34.9: ICD-10-CM

## 2025-01-23 DIAGNOSIS — E78.5 DYSLIPIDEMIA: ICD-10-CM

## 2025-01-23 DIAGNOSIS — L81.9 HYPERPIGMENTATION: ICD-10-CM

## 2025-01-23 PROCEDURE — 99395 PREV VISIT EST AGE 18-39: CPT | Performed by: FAMILY MEDICINE

## 2025-01-23 RX ORDER — HYDROXYZINE HYDROCHLORIDE 25 MG/1
25 TABLET, FILM COATED ORAL EVERY 6 HOURS PRN
Qty: 30 TABLET | Refills: 0 | Status: SHIPPED | OUTPATIENT
Start: 2025-01-23

## 2025-01-23 NOTE — ASSESSMENT & PLAN NOTE
Phototherapy did not help. Dr. Randhawa, at the time a resident, reported it is hyperpigmentation and it will come and go and he would not recommend phototherapy. Pt stopped and it had improved mostly. Would like to establish with Edis.